# Patient Record
Sex: FEMALE | Race: BLACK OR AFRICAN AMERICAN | NOT HISPANIC OR LATINO | Employment: OTHER | URBAN - METROPOLITAN AREA
[De-identification: names, ages, dates, MRNs, and addresses within clinical notes are randomized per-mention and may not be internally consistent; named-entity substitution may affect disease eponyms.]

---

## 2017-07-31 ENCOUNTER — OFFICE VISIT (OUTPATIENT)
Dept: PULMONOLOGY | Facility: CLINIC | Age: 62
End: 2017-07-31

## 2017-07-31 VITALS
HEART RATE: 96 BPM | HEIGHT: 62 IN | SYSTOLIC BLOOD PRESSURE: 150 MMHG | BODY MASS INDEX: 42.91 KG/M2 | TEMPERATURE: 99.3 F | DIASTOLIC BLOOD PRESSURE: 85 MMHG | RESPIRATION RATE: 16 BRPM | WEIGHT: 233.2 LBS | OXYGEN SATURATION: 95 %

## 2017-07-31 DIAGNOSIS — J45.20 MILD INTERMITTENT ASTHMA WITHOUT COMPLICATION: ICD-10-CM

## 2017-07-31 DIAGNOSIS — E66.01 MORBID OBESITY DUE TO EXCESS CALORIES (HCC): ICD-10-CM

## 2017-07-31 DIAGNOSIS — K21.9 GERD WITHOUT ESOPHAGITIS: ICD-10-CM

## 2017-07-31 DIAGNOSIS — I50.43 ACUTE ON CHRONIC COMBINED SYSTOLIC AND DIASTOLIC CONGESTIVE HEART FAILURE (HCC): ICD-10-CM

## 2017-07-31 DIAGNOSIS — R06.02 SHORTNESS OF BREATH: Primary | ICD-10-CM

## 2017-07-31 PROCEDURE — 94010 BREATHING CAPACITY TEST: CPT | Performed by: NURSE PRACTITIONER

## 2017-07-31 PROCEDURE — 99214 OFFICE O/P EST MOD 30 MIN: CPT | Performed by: NURSE PRACTITIONER

## 2017-07-31 PROCEDURE — 94200 LUNG FUNCTION TEST (MBC/MVV): CPT | Performed by: NURSE PRACTITIONER

## 2017-07-31 RX ORDER — LANSOPRAZOLE 30 MG/1
30 CAPSULE, DELAYED RELEASE ORAL DAILY
COMMUNITY
End: 2017-07-31 | Stop reason: SDUPTHER

## 2017-07-31 RX ORDER — MONTELUKAST SODIUM 10 MG/1
TABLET ORAL
Qty: 30 TABLET | Refills: 11 | Status: SHIPPED | OUTPATIENT
Start: 2017-07-31 | End: 2019-03-20 | Stop reason: SDUPTHER

## 2017-07-31 RX ORDER — LANSOPRAZOLE 30 MG/1
30 CAPSULE, DELAYED RELEASE ORAL DAILY
Qty: 30 CAPSULE | Refills: 10 | Status: SHIPPED | OUTPATIENT
Start: 2017-07-31 | End: 2019-07-05 | Stop reason: SDUPTHER

## 2017-11-07 ENCOUNTER — OFFICE VISIT (OUTPATIENT)
Dept: PULMONOLOGY | Facility: CLINIC | Age: 62
End: 2017-11-07

## 2017-11-07 VITALS
WEIGHT: 218.6 LBS | OXYGEN SATURATION: 96 % | TEMPERATURE: 97 F | DIASTOLIC BLOOD PRESSURE: 74 MMHG | BODY MASS INDEX: 40.23 KG/M2 | HEIGHT: 62 IN | SYSTOLIC BLOOD PRESSURE: 136 MMHG | RESPIRATION RATE: 16 BRPM | HEART RATE: 90 BPM

## 2017-11-07 DIAGNOSIS — K21.9 GERD WITHOUT ESOPHAGITIS: ICD-10-CM

## 2017-11-07 DIAGNOSIS — E66.01 MORBID OBESITY (HCC): ICD-10-CM

## 2017-11-07 DIAGNOSIS — J45.909 ASTHMA, UNSPECIFIED ASTHMA SEVERITY, UNSPECIFIED WHETHER COMPLICATED, UNSPECIFIED WHETHER PERSISTENT: Primary | ICD-10-CM

## 2017-11-07 DIAGNOSIS — I50.40 COMBINED SYSTOLIC AND DIASTOLIC CONGESTIVE HEART FAILURE, UNSPECIFIED CONGESTIVE HEART FAILURE CHRONICITY: ICD-10-CM

## 2017-11-07 PROCEDURE — 99214 OFFICE O/P EST MOD 30 MIN: CPT | Performed by: NURSE PRACTITIONER

## 2017-11-07 PROCEDURE — 94010 BREATHING CAPACITY TEST: CPT | Performed by: NURSE PRACTITIONER

## 2017-11-07 NOTE — PROGRESS NOTES
[facility] Pulmonary Follow up    CHIEF COMPLAINT       Six-month follow-up asthma    HISTORY OF PRESENT ILLNESS    Dorene Huizar is a 62 y.o.female here today for follow up; she was last here 7/31/17. She has a h/o of GERD, asthma, allergic rhinitis, and CHF.   At that time she was status post a sleep study, she was diagnosed with sleep apnea in 2010 and could never tolerated.  Luckily the study from 10/2016 did not show any evidence of sleep apnea nor did she desaturated.    Her oxygen was discontinued at that time.  She has a history of asthma, chronic diastolic congestive heart failure, and GERD.    She is taking breo; 100; 1 puff daily and has been compliant with this. In the past she hasnt taken her inhalers or medications very well. She has had no recent exacerbations.  She currently is not using the pro air.    She has a h/o of GERD; and has been compliant with prevacid; she denies  indigestion or dysphagia. She does try to follow GERD precautions.      She has no chest pains, she has chronic lower extremity edema and tries to monitor her weight.  She does remain on Lasix daily. Edema is mild now.   She has had no recent hospitalizations or emergency room visits.    She is a lifelong nonsmoker.    She has some nasal gtt; but remains on Singulair which helps.       Patient Active Problem List   Diagnosis   • Atopic rhinitis   • Asthma   • Diabetes mellitus   • Hypertension   • Morbid obesity   • GERD without esophagitis   • Combined systolic and diastolic congestive heart failure   • Unspecified diastolic (congestive) heart failure   • Snoring       No Known Allergies    Current Outpatient Prescriptions:   •  albuterol (PROVENTIL HFA;VENTOLIN HFA) 108 (90 BASE) MCG/ACT inhaler, Inhale 2 puffs every 4 (four) hours as needed for wheezing., Disp: 18 g, Rfl: 5  •  aspirin 81 MG tablet, Take 1 tablet by mouth daily., Disp: , Rfl:   •  Blood Glucose Monitoring Suppl (ONE TOUCH ULTRA MINI) W/DEVICE kit, , Disp: ,  "Rfl:   •  Calcium 600-200 MG-UNIT per tablet, Take 1 tablet by mouth daily., Disp: , Rfl:   •  Cholecalciferol (VITAMIN D PO), Take  by mouth., Disp: , Rfl:   •  FLUTICASONE PROPIONATE NA, into each nostril., Disp: , Rfl:   •  furosemide (LASIX) 20 MG tablet, Take 1 tablet by mouth daily as needed., Disp: , Rfl:   •  glucose blood (ONE TOUCH ULTRA TEST) test strip, OneTouch Ultra Blue In Vitro Strip; Patient Sig: OneTouch Ultra Blue In Vitro Strip ; 50; 0; 07-Mar-2014; Active, Disp: , Rfl:   •  lansoprazole (PREVACID) 30 MG capsule, Take 1 capsule by mouth Daily., Disp: 30 capsule, Rfl: 10  •  losartan (COZAAR) 50 MG tablet, Take  by mouth., Disp: , Rfl:   •  metFORMIN (GLUCOPHAGE) 1000 MG tablet, Take 1 tablet by mouth 2 (two) times a day., Disp: , Rfl:   •  mometasone-formoterol (DULERA) 200-5 MCG/ACT inhaler, every 12 (twelve) hours., Disp: , Rfl:   •  montelukast (SINGULAIR) 10 MG tablet, Take 1 pill daily, Disp: 30 tablet, Rfl: 11  •  potassium chloride (K-DUR,KLOR-CON) 10 MEQ CR tablet, Take  by mouth., Disp: , Rfl:   MEDICATION LIST AND ALLERGIES REVIEWED.    Social History   Substance Use Topics   • Smoking status: Never Smoker   • Smokeless tobacco: None   • Alcohol use No       FAMILY AND SOCIAL HISTORY REVIEWED.    Review of Systems   Constitutional: Negative for activity change, chills, fatigue and fever.   HENT: Negative for hearing loss, nosebleeds, postnasal drip and sneezing.    Respiratory: Negative for apnea, cough, chest tightness, shortness of breath, wheezing and stridor.    Cardiovascular: Positive for leg swelling. Negative for chest pain and palpitations.        NO INCREASE IN MILD BLE EDEMA; NO CALF TENDERNESS    Gastrointestinal: Negative for abdominal pain, diarrhea and nausea.   Neurological: Negative for dizziness, syncope and light-headedness.   .    /74  Pulse 90  Temp 97 °F (36.1 °C)  Resp 16  Ht 62\" (157.5 cm)  Wt 218 lb 9.6 oz (99.2 kg)  SpO2 96% Comment: RA  BMI 39.98 " kg/m2  Physical Exam   Constitutional: She is oriented to person, place, and time. She appears well-developed. No distress.   HENT:   Head: Normocephalic.   Eyes: Pupils are equal, round, and reactive to light.   Neck: No JVD present. No thyromegaly present.   Cardiovascular: Normal rate, regular rhythm and normal heart sounds.  Exam reveals no friction rub.    Mild BLE edema; no venous cords or calf tenderness.    Pulmonary/Chest: Effort normal. No stridor. No respiratory distress. She has no wheezes. She has no rales. She exhibits no tenderness.   Lymphadenopathy:     She has no cervical adenopathy.   Neurological: She is alert and oriented to person, place, and time.   Skin: She is not diaphoretic.   Psychiatric: She has a normal mood and affect. Her behavior is normal. Thought content normal.       RESULTS    No obstruction, MVV normal; restrictive pattern as before probably due to her weight.     PROBLEM LIST    Problem List Items Addressed This Visit        Cardiovascular and Mediastinum    Combined systolic and diastolic congestive heart failure       Respiratory    Asthma - Primary    Overview     Description: A.  Poor compliance related to medication cost.         Relevant Orders    Spirometry Without Bronchodilator (Completed)       Digestive    Morbid obesity    Overview     Description: A.  BMI 41.15 as of 2/11/2015.         GERD without esophagitis            DISCUSSION    STay on the breo and singulair.     Remain on Prevacid and she was told to stay on this    She was instructed to remain on the Lasix daily    Follow-up in 3 months with repeat spirometry    The patient was told to call and given extensions 132, 112, 104 if needed for an earlier visit if problems arise prior to the scheduled followup.     Patti Ann, ESSIE  11/07/20175:54 PM  Electronically signed     Please note that portions of this note were completed with a voice recognition program. Efforts were made to edit the dictations,  but occasionally words are mistranscribed.      CC: Jay Beasley MD

## 2018-03-13 ENCOUNTER — OFFICE VISIT (OUTPATIENT)
Dept: PULMONOLOGY | Facility: CLINIC | Age: 63
End: 2018-03-13

## 2018-03-13 VITALS
DIASTOLIC BLOOD PRESSURE: 80 MMHG | WEIGHT: 217 LBS | HEIGHT: 62 IN | HEART RATE: 92 BPM | SYSTOLIC BLOOD PRESSURE: 122 MMHG | RESPIRATION RATE: 18 BRPM | BODY MASS INDEX: 39.93 KG/M2 | OXYGEN SATURATION: 96 % | TEMPERATURE: 98.6 F

## 2018-03-13 DIAGNOSIS — J45.909 ASTHMA, UNSPECIFIED ASTHMA SEVERITY, UNSPECIFIED WHETHER COMPLICATED, UNSPECIFIED WHETHER PERSISTENT: Primary | ICD-10-CM

## 2018-03-13 PROCEDURE — 99214 OFFICE O/P EST MOD 30 MIN: CPT | Performed by: NURSE PRACTITIONER

## 2018-03-13 PROCEDURE — 94010 BREATHING CAPACITY TEST: CPT | Performed by: NURSE PRACTITIONER

## 2018-03-13 RX ORDER — ATORVASTATIN CALCIUM 40 MG/1
TABLET, FILM COATED ORAL
Refills: 6 | COMMUNITY
Start: 2018-02-14

## 2018-03-13 NOTE — PROGRESS NOTES
[facility] Pulmonary Follow up    CHIEF COMPLAINT    Six-month follow-up asthma    HISTORY OF PRESENT ILLNESS    Dorene Huizar is a 62 y.o.female here today for fu. She has a h/o of GERD, asthma, allergic rhinitis, and CHF.   At that time she was status post a sleep study, she was diagnosed with sleep apnea in 2010 and could never tolerated.  Luckily the study from 10/2016 did not show any evidence of sleep apnea nor did she desaturated.    Her oxygen was discontinued at that time.  She has a history of asthma, chronic diastolic congestive heart failure, and GERD.    She is taking breo; 100; 1 puff daily and has been compliant with this. In the past she hasnt taken her inhalers or medications very well. She has had no recent exacerbations.  She currently is not using the pro air.    She has a h/o of GERD; and has been compliant with prevacid; she denies  indigestion or dysphagia. She does try to follow GERD precautions.      She has no chest pains, she has chronic lower extremity edema and tries to monitor her weight.  She does remain on Lasix daily. Edema is mild now.   She has had no recent hospitalizations or emergency room visits.    She is a lifelong nonsmoker.    She has some nasal gtt; but remains on Singulair which helps.         Patient Active Problem List   Diagnosis   • Atopic rhinitis   • Asthma   • Diabetes mellitus   • Hypertension   • Morbid obesity   • GERD without esophagitis   • Combined systolic and diastolic congestive heart failure   • Unspecified diastolic (congestive) heart failure   • Snoring       No Known Allergies    Current Outpatient Prescriptions:   •  albuterol (PROVENTIL HFA;VENTOLIN HFA) 108 (90 BASE) MCG/ACT inhaler, Inhale 2 puffs every 4 (four) hours as needed for wheezing., Disp: 18 g, Rfl: 5  •  aspirin 81 MG tablet, Take 1 tablet by mouth daily., Disp: , Rfl:   •  atorvastatin (LIPITOR) 40 MG tablet, TK 1 T PO HS, Disp: , Rfl: 6  •  Blood Glucose Monitoring Suppl (ONE TOUCH  ULTRA MINI) W/DEVICE kit, , Disp: , Rfl:   •  CALCIUM 600+D 600-200 MG-UNIT per tablet, TK 2 TS PO QD, Disp: , Rfl: 11  •  Calcium 600-200 MG-UNIT per tablet, Take 1 tablet by mouth daily., Disp: , Rfl:   •  Cholecalciferol (VITAMIN D PO), Take  by mouth., Disp: , Rfl:   •  Fluticasone Furoate-Vilanterol 200-25 MCG/INH aerosol powder , Inhale 1 puff Daily. 1 inhalation once a day, Disp: 1 each, Rfl: 5  •  FLUTICASONE PROPIONATE NA, into each nostril., Disp: , Rfl:   •  furosemide (LASIX) 20 MG tablet, Take 1 tablet by mouth daily as needed., Disp: , Rfl:   •  glucose blood (ONE TOUCH ULTRA TEST) test strip, OneTouch Ultra Blue In Vitro Strip; Patient Sig: OneTouch Ultra Blue In Vitro Strip ; 50; 0; 07-Mar-2014; Active, Disp: , Rfl:   •  lansoprazole (PREVACID) 30 MG capsule, Take 1 capsule by mouth Daily., Disp: 30 capsule, Rfl: 10  •  losartan (COZAAR) 50 MG tablet, Take  by mouth., Disp: , Rfl:   •  metFORMIN (GLUCOPHAGE) 1000 MG tablet, Take 1 tablet by mouth 2 (two) times a day., Disp: , Rfl:   •  montelukast (SINGULAIR) 10 MG tablet, Take 1 pill daily, Disp: 30 tablet, Rfl: 11  •  potassium chloride (K-DUR,KLOR-CON) 10 MEQ CR tablet, Take  by mouth., Disp: , Rfl:   MEDICATION LIST AND ALLERGIES REVIEWED.    Social History   Substance Use Topics   • Smoking status: Never Smoker   • Smokeless tobacco: Not on file   • Alcohol use No       FAMILY AND SOCIAL HISTORY REVIEWED.    Review of Systems   Constitutional: Negative for activity change, chills, fatigue and fever.   HENT: Positive for postnasal drip. Negative for hearing loss, nosebleeds and sneezing.    Respiratory: Positive for cough. Negative for apnea, chest tightness, shortness of breath, wheezing and stridor.    Cardiovascular: Negative for chest pain, palpitations and leg swelling.        NO INCREASE IN MILD BLE EDEMA; NO CALF TENDERNESS    Gastrointestinal: Negative for abdominal pain, diarrhea and nausea.   Neurological: Negative for dizziness,  "syncope and light-headedness.   .    /80 (BP Location: Right arm, Patient Position: Sitting, Cuff Size: Adult)   Pulse 92   Temp 98.6 °F (37 °C)   Resp 18   Ht 157.5 cm (62.01\")   Wt 98.4 kg (217 lb)   SpO2 96%   BMI 39.68 kg/m²   Physical Exam   Constitutional: She is oriented to person, place, and time. She appears well-developed. No distress.   HENT:   Head: Normocephalic.   Eyes: Pupils are equal, round, and reactive to light.   Neck: No JVD present. No thyromegaly present.   Cardiovascular: Normal rate, regular rhythm and normal heart sounds.  Exam reveals no friction rub.    Mild BLE edema; no venous cords or calf tenderness.    Pulmonary/Chest: Effort normal. No stridor. No respiratory distress. She has no wheezes. She has no rales. She exhibits no tenderness.   Lymphadenopathy:     She has no cervical adenopathy.   Neurological: She is alert and oriented to person, place, and time.   Skin: She is not diaphoretic.   Psychiatric: She has a normal mood and affect. Her behavior is normal. Thought content normal.       RESULTS    No obstruction, FVC and FEV1 improved compared to prior.  FVC 1.76 L, 76%.  FEV1 1.28 L, 71%.  Minute ventilation reduced.      PROBLEM LIST    Asthma  --Improved on Brio    Diastolic congestive heart failure  --On Lasix with improvement in prior edema    History of DALILA  --Diagnosed in 2010, however repeat sleep study October 2016 did not show any evidence of sleep apnea nor did she desaturate    GERD    DISCUSSION    Continue on the Brio, Singulair and pro air as needed    She was asking if she needed a sleep study for recheck but unless she gains significant amount of weight we don't need to do this.  We reviewed her prior study.    Spirometry was reviewed today    She'll follow-up in 6 months,and was told to call and given extensions 104 if needed for an earlier visit if problems arise prior to the scheduled followup.      Patti Ann, APRN  03/13/201810:47 " AM  Electronically signed     Please note that portions of this note were completed with a voice recognition program. Efforts were made to edit the dictations, but occasionally words are mistranscribed.      CC: Jay Beasley MD

## 2018-09-18 DIAGNOSIS — J45.40 MODERATE PERSISTENT ASTHMA WITHOUT COMPLICATION: ICD-10-CM

## 2018-09-18 RX ORDER — ALBUTEROL SULFATE 90 UG/1
2 AEROSOL, METERED RESPIRATORY (INHALATION) EVERY 4 HOURS PRN
Qty: 18 G | Refills: 5 | Status: SHIPPED | OUTPATIENT
Start: 2018-09-18 | End: 2018-09-20 | Stop reason: SDUPTHER

## 2018-09-18 NOTE — TELEPHONE ENCOUNTER
Per fax drug change request for Rx Proventil Inhaler. Verbalized with Alex(pharmacist) okay to change Rx Proventil to Ventolin with same directions, quantity, and refills. Alex verbalized understanding.

## 2018-09-18 NOTE — TELEPHONE ENCOUNTER
Pt LVM requesting refill on Rx Albuterol Inhaler send to [facility]. Refilled Rx Albuterol per chart via fax. Pt notified and verbalized understanding.

## 2018-09-20 ENCOUNTER — OFFICE VISIT (OUTPATIENT)
Dept: PULMONOLOGY | Facility: CLINIC | Age: 63
End: 2018-09-20

## 2018-09-20 VITALS
TEMPERATURE: 97.4 F | WEIGHT: 215 LBS | HEIGHT: 62 IN | DIASTOLIC BLOOD PRESSURE: 98 MMHG | RESPIRATION RATE: 18 BRPM | SYSTOLIC BLOOD PRESSURE: 130 MMHG | OXYGEN SATURATION: 97 % | BODY MASS INDEX: 39.56 KG/M2 | HEART RATE: 85 BPM

## 2018-09-20 DIAGNOSIS — R06.83 SNORING: ICD-10-CM

## 2018-09-20 DIAGNOSIS — J30.2 CHRONIC SEASONAL ALLERGIC RHINITIS, UNSPECIFIED TRIGGER: ICD-10-CM

## 2018-09-20 DIAGNOSIS — E66.01 MORBID OBESITY (HCC): ICD-10-CM

## 2018-09-20 DIAGNOSIS — J45.40 MODERATE PERSISTENT ASTHMA WITHOUT COMPLICATION: ICD-10-CM

## 2018-09-20 DIAGNOSIS — J45.20 MILD INTERMITTENT ASTHMA WITHOUT COMPLICATION: ICD-10-CM

## 2018-09-20 DIAGNOSIS — R06.02 SOB (SHORTNESS OF BREATH): Primary | ICD-10-CM

## 2018-09-20 PROCEDURE — 94375 RESPIRATORY FLOW VOLUME LOOP: CPT | Performed by: NURSE PRACTITIONER

## 2018-09-20 PROCEDURE — 99213 OFFICE O/P EST LOW 20 MIN: CPT | Performed by: NURSE PRACTITIONER

## 2018-09-20 RX ORDER — ALBUTEROL SULFATE 90 UG/1
2 AEROSOL, METERED RESPIRATORY (INHALATION) EVERY 4 HOURS PRN
Qty: 18 G | Refills: 5 | Status: SHIPPED | OUTPATIENT
Start: 2018-09-20 | End: 2019-09-30 | Stop reason: SDUPTHER

## 2018-09-20 NOTE — PROGRESS NOTES
[facility] Pulmonary Follow up    CHIEF COMPLAINT    Mild intermittent asthma    HISTORY OF PRESENT ILLNESS    Dorene Huizar is a 63 y.o.female here today for routine follow-up.  She is less in the office in March 2018 by Mrs. Ann for a history of mild intermittent asthma with allergic rhinitis.  She uses Breo daily and as needed albuterol.  She only occasionally uses her albuterol.  She's not using any of her nebulizers currently.  Last week she visited her primary care with worsening post nasal drainage and wheezing.  She is completed a course of steroids.  She's not had any wheezing last couple days.  She does have a nonproductive cough.  She continues have quite a bit of postnasal drainage.    She also has chronic reflux and is on a PPI.  She's had no issues with reflux or dysphagia lately.    She follows up with her primary care for her chronic diastolic heart failure with some chronic lower extremity edema.        Patient Active Problem List   Diagnosis   • Atopic rhinitis   • Asthma   • Diabetes mellitus (CMS/HCC)   • Hypertension   • Morbid obesity (CMS/HCC)   • GERD without esophagitis   • Combined systolic and diastolic congestive heart failure (CMS/HCC)   • Unspecified diastolic (congestive) heart failure (CMS/HCC)   • Snoring       No Known Allergies    Current Outpatient Prescriptions:   •  albuterol (PROVENTIL HFA;VENTOLIN HFA) 108 (90 Base) MCG/ACT inhaler, Inhale 2 puffs Every 4 (Four) Hours As Needed for Wheezing., Disp: 18 g, Rfl: 5  •  aspirin 81 MG tablet, Take 1 tablet by mouth daily., Disp: , Rfl:   •  atorvastatin (LIPITOR) 40 MG tablet, TK 1 T PO HS, Disp: , Rfl: 6  •  Blood Glucose Monitoring Suppl (ONE TOUCH ULTRA MINI) W/DEVICE kit, , Disp: , Rfl:   •  CALCIUM 600+D 600-200 MG-UNIT per tablet, TK 2 TS PO QD, Disp: , Rfl: 11  •  Calcium 600-200 MG-UNIT per tablet, Take 1 tablet by mouth daily., Disp: , Rfl:   •  Cholecalciferol (VITAMIN D PO), Take  by mouth., Disp: , Rfl:   •   Fluticasone Furoate-Vilanterol 200-25 MCG/INH aerosol powder , Inhale 1 puff Daily. 1 inhalation once a day, Disp: 1 each, Rfl: 11  •  FLUTICASONE PROPIONATE NA, into each nostril., Disp: , Rfl:   •  furosemide (LASIX) 20 MG tablet, Take 1 tablet by mouth daily as needed., Disp: , Rfl:   •  glucose blood (ONE TOUCH ULTRA TEST) test strip, OneTouch Ultra Blue In Vitro Strip; Patient Sig: OneTouch Ultra Blue In Vitro Strip ; 50; 0; 07-Mar-2014; Active, Disp: , Rfl:   •  lansoprazole (PREVACID) 30 MG capsule, Take 1 capsule by mouth Daily., Disp: 30 capsule, Rfl: 10  •  losartan (COZAAR) 50 MG tablet, Take  by mouth., Disp: , Rfl:   •  metFORMIN (GLUCOPHAGE) 1000 MG tablet, Take 1 tablet by mouth 2 (two) times a day., Disp: , Rfl:   •  montelukast (SINGULAIR) 10 MG tablet, Take 1 pill daily, Disp: 30 tablet, Rfl: 11  •  potassium chloride (K-DUR,KLOR-CON) 10 MEQ CR tablet, Take  by mouth., Disp: , Rfl:   MEDICATION LIST AND ALLERGIES REVIEWED.    Social History   Substance Use Topics   • Smoking status: Never Smoker   • Smokeless tobacco: Not on file   • Alcohol use No       FAMILY AND SOCIAL HISTORY REVIEWED.    Review of Systems   Constitutional: Negative for chills, fatigue, fever and unexpected weight change.   HENT: Positive for postnasal drip. Negative for congestion, nosebleeds, rhinorrhea, sinus pressure and trouble swallowing.    Respiratory: Positive for chest tightness, shortness of breath and wheezing. Negative for cough.    Cardiovascular: Negative for chest pain and leg swelling.   Gastrointestinal: Negative for abdominal pain, constipation, diarrhea, nausea and vomiting.   Genitourinary: Negative for dysuria, frequency, hematuria and urgency.   Musculoskeletal: Negative for myalgias.   Neurological: Negative for dizziness, weakness, numbness and headaches.   All other systems reviewed and are negative.  .    /98 (BP Location: Left arm, Patient Position: Sitting, Cuff Size: Adult)   Pulse 85    "Temp 97.4 °F (36.3 °C)   Resp 18   Ht 157.5 cm (62\")   Wt 97.5 kg (215 lb)   SpO2 97% Comment: RA AT REST  BMI 39.32 kg/m²       There is no immunization history on file for this patient.    Physical Exam   Constitutional: She is oriented to person, place, and time. She appears well-developed and well-nourished.   HENT:   Head: Normocephalic and atraumatic.   Eyes: Pupils are equal, round, and reactive to light. EOM are normal.   Neck: Normal range of motion. Neck supple.   Cardiovascular: Normal rate and regular rhythm.    No murmur heard.  Pulmonary/Chest: Effort normal and breath sounds normal. No respiratory distress. She has no wheezes. She has no rales.   Abdominal: Soft. Bowel sounds are normal. She exhibits no distension.   Musculoskeletal: Normal range of motion. She exhibits no edema.   Neurological: She is alert and oriented to person, place, and time.   Skin: Skin is warm and dry. No erythema.   Psychiatric: She has a normal mood and affect. Her behavior is normal.   Vitals reviewed.        RESULTS    PFTS in the office today, read by me:  Moderate restriction with a forced vital capacity 1.46, 64%.    PROBLEM LIST    Problem List Items Addressed This Visit        Respiratory    Atopic rhinitis    Asthma    Overview     Description: A.  Poor compliance related to medication cost.         Relevant Medications    Fluticasone Furoate-Vilanterol 200-25 MCG/INH aerosol powder     albuterol (PROVENTIL HFA;VENTOLIN HFA) 108 (90 Base) MCG/ACT inhaler    Snoring    Overview     History of obstructive sleep apnea which was treated around 2010, she stopped wearing the mask and her most recent study 10/27/16 revealed no DALILA, on room air no desaturations            Digestive    Morbid obesity (CMS/[facility])    Overview     Description: A.  BMI 41.15 as of 2/11/2015.           Other Visit Diagnoses     SOB (shortness of breath)    -  Primary    Relevant Orders    XR Chest PA & Lateral    Spirometry Without " Bronchodilator (Completed)            DISCUSSION    Overall she's done quite well.  She's had a little bit of worsening wheezing and cough with seasonal allergies.  Continue on her Singulair and over-the-counter antihistamine.  Continue on her Briel daily and albuterol as needed.  She does have nebulizers at home if it worsens.    We will and over her chest x-ray and PFTs in the office today.  She does continue have moderate restriction somewhat down from March but probably related to her recent exacerbation.    Follow-up in 6 months with repeat spiral.    I spent 15 minutes with the patient. I spent > 50% percent of this time counseling and discussing diagnostic testing, evaluation, current status and management.    Emily Rivero, APRN  09/20/201810:44 AM  Electronically signed     Please note that portions of this note were completed with a voice recognition program. Efforts were made to edit the dictations, but occasionally words are mistranscribed.      CC: Jay Beasley MD

## 2019-03-20 ENCOUNTER — OFFICE VISIT (OUTPATIENT)
Dept: PULMONOLOGY | Facility: CLINIC | Age: 64
End: 2019-03-20

## 2019-03-20 VITALS
WEIGHT: 220.2 LBS | HEIGHT: 62 IN | DIASTOLIC BLOOD PRESSURE: 72 MMHG | BODY MASS INDEX: 40.52 KG/M2 | SYSTOLIC BLOOD PRESSURE: 132 MMHG | TEMPERATURE: 97.9 F | HEART RATE: 95 BPM | OXYGEN SATURATION: 96 %

## 2019-03-20 DIAGNOSIS — R06.83 SNORING: ICD-10-CM

## 2019-03-20 DIAGNOSIS — J30.2 SEASONAL ALLERGIC RHINITIS, UNSPECIFIED TRIGGER: ICD-10-CM

## 2019-03-20 DIAGNOSIS — K21.9 GERD WITHOUT ESOPHAGITIS: ICD-10-CM

## 2019-03-20 DIAGNOSIS — J45.909 ASTHMA, UNSPECIFIED ASTHMA SEVERITY, UNSPECIFIED WHETHER COMPLICATED, UNSPECIFIED WHETHER PERSISTENT: Primary | ICD-10-CM

## 2019-03-20 DIAGNOSIS — E66.01 MORBID OBESITY (HCC): ICD-10-CM

## 2019-03-20 PROCEDURE — 99214 OFFICE O/P EST MOD 30 MIN: CPT | Performed by: NURSE PRACTITIONER

## 2019-03-20 PROCEDURE — 94375 RESPIRATORY FLOW VOLUME LOOP: CPT | Performed by: NURSE PRACTITIONER

## 2019-03-20 RX ORDER — MONTELUKAST SODIUM 10 MG/1
TABLET ORAL
Qty: 30 TABLET | Refills: 11 | Status: SHIPPED | OUTPATIENT
Start: 2019-03-20 | End: 2019-07-05 | Stop reason: SDUPTHER

## 2019-03-20 RX ORDER — FLUTICASONE PROPIONATE 50 MCG
1 SPRAY, SUSPENSION (ML) NASAL DAILY
Qty: 1 BOTTLE | Refills: 3 | Status: SHIPPED | OUTPATIENT
Start: 2019-03-20 | End: 2019-09-23

## 2019-03-20 NOTE — PROGRESS NOTES
[facility] Pulmonary Follow up    CHIEF COMPLAINT    Asthma    Subjective   HISTORY OF PRESENT ILLNESS    Dorene Huizar is a 63 y.o.female here today for routine 6-month follow-up on her asthma.  She does have mild intermittent asthma symptoms with moderate restriction on her PFTs.    Her chronic maintenance regimen includes Breo daily and Singulair nightly.  She has very limited rescue inhaler use.  She had one acute exacerbations as I last saw her secondary to being exposed to some secondhand smoke.  She completed a course of prednisone, and improved.    Today she has no cough or sputum production.  She rarely wheezes.  She has some chronic dyspnea on activity but nothing that changed over time.    She does not had any changes in her overall health history since I last saw her.    Patient Active Problem List   Diagnosis   • Allergic rhinitis   • Asthma   • Diabetes mellitus (CMS/HCC)   • Hypertension   • Morbid obesity (CMS/HCC)   • GERD without esophagitis   • Combined systolic and diastolic congestive heart failure (CMS/HCC)   • Unspecified diastolic (congestive) heart failure (CMS/HCC)   • Snoring       No Known Allergies    Current Outpatient Medications:   •  albuterol (PROVENTIL HFA;VENTOLIN HFA) 108 (90 Base) MCG/ACT inhaler, Inhale 2 puffs Every 4 (Four) Hours As Needed for Wheezing., Disp: 18 g, Rfl: 5  •  aspirin 81 MG tablet, Take 1 tablet by mouth daily., Disp: , Rfl:   •  atorvastatin (LIPITOR) 40 MG tablet, TK 1 T PO HS, Disp: , Rfl: 6  •  Blood Glucose Monitoring Suppl (ONE TOUCH ULTRA MINI) W/DEVICE kit, , Disp: , Rfl:   •  CALCIUM 600+D 600-200 MG-UNIT per tablet, TK 2 TS PO QD, Disp: , Rfl: 11  •  Calcium 600-200 MG-UNIT per tablet, Take 1 tablet by mouth daily., Disp: , Rfl:   •  Cholecalciferol (VITAMIN D PO), Take  by mouth., Disp: , Rfl:   •  fluticasone (FLONASE) 50 MCG/ACT nasal spray, 1 spray into the nostril(s) as directed by provider Daily., Disp: 1 bottle, Rfl: 3  •  Fluticasone  "Furoate-Vilanterol (BREO ELLIPTA) 200-25 MCG/INH inhaler, Inhale 1 puff Daily. 1 inhalation once a day, Disp: 1 each, Rfl: 11  •  furosemide (LASIX) 20 MG tablet, Take 1 tablet by mouth daily as needed., Disp: , Rfl:   •  glucose blood (ONE TOUCH ULTRA TEST) test strip, OneTouch Ultra Blue In Vitro Strip; Patient Sig: OneTouch Ultra Blue In Vitro Strip ; 50; 0; 07-Mar-2014; Active, Disp: , Rfl:   •  lansoprazole (PREVACID) 30 MG capsule, Take 1 capsule by mouth Daily., Disp: 30 capsule, Rfl: 10  •  losartan (COZAAR) 50 MG tablet, Take  by mouth., Disp: , Rfl:   •  metFORMIN (GLUCOPHAGE) 1000 MG tablet, Take 1 tablet by mouth 2 (two) times a day., Disp: , Rfl:   •  montelukast (SINGULAIR) 10 MG tablet, Take 1 pill daily, Disp: 30 tablet, Rfl: 11  •  potassium chloride (K-DUR,KLOR-CON) 10 MEQ CR tablet, Take  by mouth., Disp: , Rfl:   MEDICATION LIST AND ALLERGIES REVIEWED.    Social History     Tobacco Use   • Smoking status: Never Smoker   Substance Use Topics   • Alcohol use: No   • Drug use: No       FAMILY AND SOCIAL HISTORY REVIEWED.    Review of Systems   Constitutional: Negative for chills, fatigue, fever and unexpected weight change.   HENT: Negative for congestion, nosebleeds, postnasal drip, rhinorrhea, sinus pressure and trouble swallowing.    Respiratory: Negative for cough, chest tightness, shortness of breath and wheezing.    Cardiovascular: Negative for chest pain and leg swelling.   Gastrointestinal: Negative for abdominal pain, constipation, diarrhea, nausea and vomiting.   Genitourinary: Negative for dysuria, frequency, hematuria and urgency.   Musculoskeletal: Negative for myalgias.   Neurological: Negative for dizziness, weakness, numbness and headaches.   All other systems reviewed and are negative.  .  Objective   /72 (BP Location: Right arm, Patient Position: Sitting, Cuff Size: Adult)   Pulse 95   Temp 97.9 °F (36.6 °C)   Ht 157.5 cm (62\")   Wt 99.9 kg (220 lb 3.2 oz)   SpO2 96% " Comment: sitting room air  BMI 40.28 kg/m²     Immunization History   Administered Date(s) Administered   • Flu Vaccine High Dose PF 65YR+ 10/20/2018   • Hep A / Hep B 12/20/2018   • Pneumococcal Conjugate 13-Valent (PCV13) 11/20/2018       Physical Exam   Constitutional: She is oriented to person, place, and time. She appears well-developed and well-nourished.   HENT:   Head: Normocephalic and atraumatic.   Eyes: EOM are normal. Pupils are equal, round, and reactive to light.   Neck: Normal range of motion. Neck supple.   Cardiovascular: Normal rate and regular rhythm.   No murmur heard.  Pulmonary/Chest: Effort normal and breath sounds normal. No respiratory distress. She has no wheezes. She has no rales.   Abdominal: Soft. Bowel sounds are normal. She exhibits no distension.   Musculoskeletal: Normal range of motion. She exhibits no edema.   Neurological: She is alert and oriented to person, place, and time.   Skin: Skin is warm and dry. No erythema.   Psychiatric: She has a normal mood and affect. Her behavior is normal.   Vitals reviewed.        RESULTS    PFTS in the office today, read by me:  Mild restriction with an FVC of 1.70, 75%.  Actually improved from September 1 0.46, 64%    Assessment/Plan     PROBLEM LIST    Problem List Items Addressed This Visit        Respiratory    Allergic rhinitis    Asthma - Primary    Relevant Medications    Fluticasone Furoate-Vilanterol (BREO ELLIPTA) 200-25 MCG/INH inhaler    montelukast (SINGULAIR) 10 MG tablet    Other Relevant Orders    Spirometry Without Bronchodilator (Completed)    Snoring    Overview     History of obstructive sleep apnea which was treated around 2010, she stopped wearing the mask and her most recent study 10/27/16 revealed no DALILA, on room air no desaturations            Digestive    Morbid obesity (CMS/HCC)    Overview     Description: A.  BMI 41.15 as of 2/11/2015.         GERD without esophagitis            DISCUSSION    Today she is doing  quite well.  She only had one episode since I last saw her and that was secondary to some secondhand smoke exposure.  Continue on her Breo daily and Singulair nightly.    She does use some Flonase as needed seasonally for some nasal congestion, I will refill that for her today.    Follow-up in 6 months or sooner with any episodes of exacerbation.    I spent 15 minutes with the patient. I spent > 50% percent of this time counseling and discussing diagnostic testing, evaluation, current status and management.    Emily Rivero, APRN  03/20/201911:12 AM  Electronically signed     Please note that portions of this note were completed with a voice recognition program. Efforts were made to edit the dictations, but occasionally words are mistranscribed.      CC: Jay Beasley MD

## 2019-03-28 ENCOUNTER — TELEPHONE (OUTPATIENT)
Dept: PULMONOLOGY | Facility: CLINIC | Age: 64
End: 2019-03-28

## 2019-03-28 NOTE — TELEPHONE ENCOUNTER
Patient complaining of excess gas, states she's swallowing air and burping in excess. Asymptomatic otherwise. I advised her to contact her PCP and if symptoms worsen or SOA occurs, to present to ER.

## 2019-07-05 DIAGNOSIS — K21.9 GERD WITHOUT ESOPHAGITIS: Primary | ICD-10-CM

## 2019-07-05 DIAGNOSIS — J45.909 ASTHMA, UNSPECIFIED ASTHMA SEVERITY, UNSPECIFIED WHETHER COMPLICATED, UNSPECIFIED WHETHER PERSISTENT: Primary | ICD-10-CM

## 2019-07-05 RX ORDER — MONTELUKAST SODIUM 10 MG/1
TABLET ORAL
Qty: 30 TABLET | Refills: 11 | Status: SHIPPED | OUTPATIENT
Start: 2019-07-05 | End: 2021-10-28 | Stop reason: SDUPTHER

## 2019-07-05 RX ORDER — LANSOPRAZOLE 30 MG/1
30 CAPSULE, DELAYED RELEASE ORAL DAILY
Qty: 30 CAPSULE | Refills: 5 | Status: SHIPPED | OUTPATIENT
Start: 2019-07-05 | End: 2020-09-30 | Stop reason: ALTCHOICE

## 2019-09-23 ENCOUNTER — OFFICE VISIT (OUTPATIENT)
Dept: PULMONOLOGY | Facility: CLINIC | Age: 64
End: 2019-09-23

## 2019-09-23 VITALS
DIASTOLIC BLOOD PRESSURE: 76 MMHG | OXYGEN SATURATION: 96 % | HEIGHT: 62 IN | SYSTOLIC BLOOD PRESSURE: 130 MMHG | BODY MASS INDEX: 39.38 KG/M2 | WEIGHT: 214 LBS | HEART RATE: 76 BPM | TEMPERATURE: 98.3 F

## 2019-09-23 DIAGNOSIS — J45.20 MILD INTERMITTENT ASTHMA WITHOUT COMPLICATION: ICD-10-CM

## 2019-09-23 DIAGNOSIS — R06.83 SNORING: Primary | ICD-10-CM

## 2019-09-23 DIAGNOSIS — J30.2 SEASONAL ALLERGIC RHINITIS, UNSPECIFIED TRIGGER: ICD-10-CM

## 2019-09-23 PROCEDURE — 99213 OFFICE O/P EST LOW 20 MIN: CPT | Performed by: NURSE PRACTITIONER

## 2019-09-23 NOTE — PROGRESS NOTES
[facility] Pulmonary Follow up    CHIEF COMPLAINT    Asthma, shortness of breath     Subjective   HISTORY OF PRESENT ILLNESS    Dorene Huizar is a 64 y.o.female here today for routine follow-up on her mild intermittent asthma symptoms with moderate obstruction on her PFTs.  Chronically she is on Breo daily and Singulair.  She has been followed in our office for several years but by Dr. Dorsey as well as Patti Ann.  I last saw her in March.    She has done very well without any episodes of chest tightness or wheezing.  No cough or sputum production.  Her dyspnea is at baseline.  She has very little use of her rescue inhaler but does like to keep it handy just in case.    She states she did follow-up at [facility] pulmonary office and had a overnight sleep study done.  She said they told her she needed oxygen but no one is ever followed up to get that started for her.        Patient Active Problem List   Diagnosis   • Allergic rhinitis   • Asthma   • Diabetes mellitus (CMS/HCC)   • Hypertension   • Morbid obesity (CMS/HCC)   • GERD without esophagitis   • Combined systolic and diastolic congestive heart failure (CMS/HCC)   • Unspecified diastolic (congestive) heart failure (CMS/HCC)   • Snoring       No Known Allergies    Current Outpatient Medications:   •  albuterol (PROVENTIL HFA;VENTOLIN HFA) 108 (90 Base) MCG/ACT inhaler, Inhale 2 puffs Every 4 (Four) Hours As Needed for Wheezing., Disp: 18 g, Rfl: 5  •  aspirin 81 MG tablet, Take 1 tablet by mouth daily., Disp: , Rfl:   •  atorvastatin (LIPITOR) 40 MG tablet, TK 1 T PO HS, Disp: , Rfl: 6  •  Blood Glucose Monitoring Suppl (ONE TOUCH ULTRA MINI) W/DEVICE kit, , Disp: , Rfl:   •  CALCIUM 600+D 600-200 MG-UNIT per tablet, TK 2 TS PO QD, Disp: , Rfl: 11  •  Calcium 600-200 MG-UNIT per tablet, Take 1 tablet by mouth daily., Disp: , Rfl:   •  Cholecalciferol (VITAMIN D PO), Take  by mouth., Disp: , Rfl:   •  furosemide (LASIX) 20 MG tablet, Take 1  "tablet by mouth daily as needed., Disp: , Rfl:   •  glucose blood (ONE TOUCH ULTRA TEST) test strip, OneTouch Ultra Blue In Vitro Strip; Patient Sig: OneTouch Ultra Blue In Vitro Strip ; 50; 0; 07-Mar-2014; Active, Disp: , Rfl:   •  lansoprazole (PREVACID) 30 MG capsule, Take 1 capsule by mouth Daily., Disp: 30 capsule, Rfl: 5  •  losartan (COZAAR) 50 MG tablet, Take  by mouth., Disp: , Rfl:   •  metFORMIN (GLUCOPHAGE) 1000 MG tablet, Take 1 tablet by mouth 2 (two) times a day., Disp: , Rfl:   •  montelukast (SINGULAIR) 10 MG tablet, Take 1 pill daily, Disp: 30 tablet, Rfl: 11  •  potassium chloride (K-DUR,KLOR-CON) 10 MEQ CR tablet, Take  by mouth., Disp: , Rfl:   •  Fluticasone Furoate-Vilanterol (BREO ELLIPTA) 100-25 MCG/INH inhaler, Inhale 1 puff Daily. 1 inhalation once a day, Disp: 1 each, Rfl: 11  MEDICATION LIST AND ALLERGIES REVIEWED.    Social History     Tobacco Use   • Smoking status: Never Smoker   Substance Use Topics   • Alcohol use: No   • Drug use: No       FAMILY AND SOCIAL HISTORY REVIEWED.    Review of Systems   Constitutional: Negative for chills, fatigue, fever and unexpected weight change.   HENT: Negative for congestion, nosebleeds, postnasal drip, rhinorrhea, sinus pressure and trouble swallowing.    Respiratory: Negative for cough, chest tightness, shortness of breath and wheezing.    Cardiovascular: Negative for chest pain and leg swelling.   Gastrointestinal: Negative for abdominal pain, constipation, diarrhea, nausea and vomiting.   Genitourinary: Negative for dysuria, frequency, hematuria and urgency.   Musculoskeletal: Negative for myalgias.   Neurological: Negative for dizziness, weakness, numbness and headaches.   All other systems reviewed and are negative.  .  Objective   /76   Pulse 76   Temp 98.3 °F (36.8 °C)   Ht 157.5 cm (62\")   Wt 97.1 kg (214 lb)   LMP  (LMP Unknown)   SpO2 96% Comment: resting, room air  Breastfeeding? No   BMI 39.14 kg/m²     Immunization " History   Administered Date(s) Administered   • Flu Vaccine High Dose PF 65YR+ 10/20/2018   • Hep A / Hep B 12/20/2018   • Pneumococcal Conjugate 13-Valent (PCV13) 11/20/2018       Physical Exam   Constitutional: She is oriented to person, place, and time. She appears well-developed and well-nourished.   HENT:   Head: Normocephalic and atraumatic.   Eyes: EOM are normal. Pupils are equal, round, and reactive to light.   Neck: Normal range of motion. Neck supple.   Cardiovascular: Normal rate and regular rhythm.   No murmur heard.  Pulmonary/Chest: Effort normal and breath sounds normal. No respiratory distress. She has no wheezes. She has no rales.   Abdominal: Soft. Bowel sounds are normal. She exhibits no distension.   Musculoskeletal: Normal range of motion. She exhibits no edema.   Neurological: She is alert and oriented to person, place, and time.   Skin: Skin is warm and dry. No erythema.   Psychiatric: She has a normal mood and affect. Her behavior is normal.   Vitals reviewed.        RESULTS        Assessment/Plan     PROBLEM LIST    Problem List Items Addressed This Visit        Respiratory    Allergic rhinitis    Asthma    Relevant Medications    Fluticasone Furoate-Vilanterol (BREO ELLIPTA) 100-25 MCG/INH inhaler    Snoring - Primary    Overview     History of obstructive sleep apnea which was treated around 2010, she stopped wearing the mask and her most recent study 10/27/16 revealed no DALILA, on room air no desaturations                 DISCUSSION    Continue on her Breo, I will switch her to the 100 mcg instead of 200.  She has not had any episode of exacerbation or illnesses.  Continue with her seasonal allergy treatment.    I will follow-up on her recent home sleep study done at [facility] pulmonology.  She would like to stay here and have with her continued care at our office.    Follow-up in 6 months with repeat PFTs and a chest x-ray      I spent 15 minutes with the patient and family. I  spent > 50% percent of this time counseling and discussing evaluation, current status and management.    Emily Rivero, APRN  09/23/201910:40 AM  Electronically signed     Please note that portions of this note were completed with a voice recognition program. Efforts were made to edit the dictations, but occasionally words are mistranscribed.      CC: System, Provider Not In

## 2019-09-25 ENCOUNTER — TELEPHONE (OUTPATIENT)
Dept: PULMONOLOGY | Facility: CLINIC | Age: 64
End: 2019-09-25

## 2019-09-25 NOTE — TELEPHONE ENCOUNTER
Sandee called to let us know that the patient needs a new sleep test to get o2. Her sleep Study was longer than 30 days ago and it all has to be within 30 days. The patient should be seen and order another test.

## 2019-09-30 ENCOUNTER — TELEPHONE (OUTPATIENT)
Dept: PULMONOLOGY | Facility: CLINIC | Age: 64
End: 2019-09-30

## 2019-09-30 DIAGNOSIS — J45.40 MODERATE PERSISTENT ASTHMA WITHOUT COMPLICATION: ICD-10-CM

## 2019-09-30 RX ORDER — ALBUTEROL SULFATE 90 UG/1
2 AEROSOL, METERED RESPIRATORY (INHALATION) EVERY 4 HOURS PRN
Qty: 18 G | Refills: 5 | Status: SHIPPED | OUTPATIENT
Start: 2019-09-30 | End: 2020-03-20 | Stop reason: SDUPTHER

## 2019-10-08 ENCOUNTER — TELEPHONE (OUTPATIENT)
Dept: PULMONOLOGY | Facility: CLINIC | Age: 64
End: 2019-10-08

## 2019-10-21 ENCOUNTER — OFFICE VISIT (OUTPATIENT)
Dept: PULMONOLOGY | Facility: CLINIC | Age: 64
End: 2019-10-21

## 2019-10-21 VITALS
HEIGHT: 62 IN | HEART RATE: 110 BPM | DIASTOLIC BLOOD PRESSURE: 80 MMHG | WEIGHT: 221 LBS | SYSTOLIC BLOOD PRESSURE: 180 MMHG | BODY MASS INDEX: 40.67 KG/M2 | TEMPERATURE: 98.2 F | OXYGEN SATURATION: 98 %

## 2019-10-21 DIAGNOSIS — I50.30 DIASTOLIC CONGESTIVE HEART FAILURE, UNSPECIFIED HF CHRONICITY (HCC): ICD-10-CM

## 2019-10-21 DIAGNOSIS — J45.21 MILD INTERMITTENT ASTHMA WITH ACUTE EXACERBATION: Primary | ICD-10-CM

## 2019-10-21 PROCEDURE — 99213 OFFICE O/P EST LOW 20 MIN: CPT | Performed by: NURSE PRACTITIONER

## 2019-10-21 RX ORDER — PREDNISONE 20 MG/1
TABLET ORAL
Refills: 0 | COMMUNITY
Start: 2019-10-20 | End: 2020-09-30

## 2019-10-21 NOTE — PROGRESS NOTES
[facility] Pulmonary Follow up    CHIEF COMPLAINT    Asthma exacerbation    Subjective   HISTORY OF PRESENT ILLNESS    Dorene Huizar is a 64 y.o.female here today for a walk in sick visit.     She was seen in the emergency department at  last evening for an asthma exacerbation.  Her chest x-ray and CT scan of the chest was reviewed and had no acute findings.  She came into the office today asking to be seen.  She was still having some wheezing.  Apparently she did receive a IV Solu-Medrol in the hospital last evening and was put on prednisone 20 mg daily as well as some Tessalon Perles.    She continues to use her Breo 100, as well as her albuterol HFA.  She has a nebulizer machine at home but has not been using it, she is been out of medication.    She has no fevers or chills.  Her cough is largely clear in production.        Patient Active Problem List   Diagnosis   • Allergic rhinitis   • Asthma   • Diabetes mellitus (CMS/HCC)   • Hypertension   • Morbid obesity (CMS/HCC)   • GERD without esophagitis   • Combined systolic and diastolic congestive heart failure (CMS/HCC)   • Unspecified diastolic (congestive) heart failure (CMS/HCC)   • Snoring       No Known Allergies    Current Outpatient Medications:   •  Fluticasone Furoate-Vilanterol (BREO ELLIPTA) 100-25 MCG/INH inhaler, Inhale 1 puff Daily. 1 inhalation once a day, Disp: 1 each, Rfl: 11  •  albuterol sulfate  (90 Base) MCG/ACT inhaler, Inhale 2 puffs Every 4 (Four) Hours As Needed for Wheezing., Disp: 18 g, Rfl: 5  •  aspirin 81 MG tablet, Take 1 tablet by mouth daily., Disp: , Rfl:   •  atorvastatin (LIPITOR) 40 MG tablet, TK 1 T PO HS, Disp: , Rfl: 6  •  Blood Glucose Monitoring Suppl (ONE TOUCH ULTRA MINI) W/DEVICE kit, , Disp: , Rfl:   •  CALCIUM 600+D 600-200 MG-UNIT per tablet, TK 2 TS PO QD, Disp: , Rfl: 11  •  Calcium 600-200 MG-UNIT per tablet, Take 1 tablet by mouth daily., Disp: , Rfl:   •  Cholecalciferol (VITAMIN D PO), Take  by mouth.,  "Disp: , Rfl:   •  furosemide (LASIX) 20 MG tablet, Take 1 tablet by mouth daily as needed., Disp: , Rfl:   •  glucose blood (ONE TOUCH ULTRA TEST) test strip, OneTouch Ultra Blue In Vitro Strip; Patient Sig: OneTouch Ultra Blue In Vitro Strip ; 50; 0; 07-Mar-2014; Active, Disp: , Rfl:   •  lansoprazole (PREVACID) 30 MG capsule, Take 1 capsule by mouth Daily., Disp: 30 capsule, Rfl: 5  •  losartan (COZAAR) 50 MG tablet, Take  by mouth., Disp: , Rfl:   •  metFORMIN (GLUCOPHAGE) 1000 MG tablet, Take 1 tablet by mouth 2 (two) times a day., Disp: , Rfl:   •  montelukast (SINGULAIR) 10 MG tablet, Take 1 pill daily, Disp: 30 tablet, Rfl: 11  •  potassium chloride (K-DUR,KLOR-CON) 10 MEQ CR tablet, Take  by mouth., Disp: , Rfl:   •  predniSONE (DELTASONE) 20 MG tablet, TK 1 T PO QD STARTING ON 10-21-19, Disp: , Rfl: 0  MEDICATION LIST AND ALLERGIES REVIEWED.    Social History     Tobacco Use   • Smoking status: Never Smoker   Substance Use Topics   • Alcohol use: No   • Drug use: No       FAMILY AND SOCIAL HISTORY REVIEWED.    Review of Systems   Constitutional: Negative for chills, fatigue, fever and unexpected weight change.   HENT: Negative for congestion, nosebleeds, postnasal drip, rhinorrhea, sinus pressure and trouble swallowing.    Respiratory: Positive for cough, shortness of breath and wheezing. Negative for chest tightness.    Cardiovascular: Negative for chest pain and leg swelling.   Gastrointestinal: Negative for abdominal pain, constipation, diarrhea, nausea and vomiting.   Genitourinary: Negative for dysuria, frequency, hematuria and urgency.   Musculoskeletal: Negative for myalgias.   Neurological: Negative for dizziness, weakness, numbness and headaches.   All other systems reviewed and are negative.  .  Objective   /80 (BP Location: Right arm, Patient Position: Sitting)   Pulse 110   Temp 98.2 °F (36.8 °C)   Ht 157.5 cm (62\")   Wt 100 kg (221 lb)   LMP  (LMP Unknown)   SpO2 98% Comment: " resting at room air  BMI 40.42 kg/m²     Immunization History   Administered Date(s) Administered   • Flu Vaccine High Dose PF 65YR+ 10/20/2018   • Hep A / Hep B 12/20/2018   • Pneumococcal Conjugate 13-Valent (PCV13) 11/20/2018       Physical Exam   Constitutional: She is oriented to person, place, and time. She appears well-developed and well-nourished.   HENT:   Head: Normocephalic and atraumatic.   Eyes: EOM are normal. Pupils are equal, round, and reactive to light.   Neck: Normal range of motion. Neck supple.   Cardiovascular: Normal rate and regular rhythm.   No murmur heard.  Pulmonary/Chest: Effort normal. No respiratory distress. She has wheezes (insp and expitory ). She has no rales.   Abdominal: Soft. Bowel sounds are normal. She exhibits no distension.   Musculoskeletal: Normal range of motion. She exhibits no edema.   Neurological: She is alert and oriented to person, place, and time.   Skin: Skin is warm and dry. No erythema.   Psychiatric: She has a normal mood and affect. Her behavior is normal.   Vitals reviewed.        RESULTS            Assessment/Plan     PROBLEM LIST    Problem List Items Addressed This Visit        Cardiovascular and Mediastinum    Unspecified diastolic (congestive) heart failure (CMS/HCC)       Respiratory    Asthma - Primary            DISCUSSION    She does have some acute bronchospasms in the office today, no evidence of hypoxemia.   Continue her prednisone for her asthma exacerbation.  I did give her some albuterol nebulizers here in the office today to use for the next few days until her wheezing has subsided.    She does not appear acutely infected.    Routine follow-up as scheduled.    I spent 15 minutes with the patient. I spent > 50% percent of this time counseling and discussing evaluation, current status and management.    Emily Rivero, APRN  10/21/189283:55 PM  Electronically signed     Please note that portions of this note were completed with a voice  recognition program. Efforts were made to edit the dictations, but occasionally words are mistranscribed.      CC: Erlinda Monterroso, APRN

## 2019-12-23 ENCOUNTER — OFFICE VISIT (OUTPATIENT)
Dept: PULMONOLOGY | Facility: CLINIC | Age: 64
End: 2019-12-23

## 2019-12-23 VITALS
TEMPERATURE: 99 F | OXYGEN SATURATION: 97 % | RESPIRATION RATE: 18 BRPM | HEIGHT: 62 IN | SYSTOLIC BLOOD PRESSURE: 126 MMHG | WEIGHT: 215.25 LBS | DIASTOLIC BLOOD PRESSURE: 84 MMHG | HEART RATE: 89 BPM | BODY MASS INDEX: 39.61 KG/M2

## 2019-12-23 DIAGNOSIS — E66.01 MORBID OBESITY (HCC): ICD-10-CM

## 2019-12-23 DIAGNOSIS — I50.40 COMBINED SYSTOLIC AND DIASTOLIC CONGESTIVE HEART FAILURE, UNSPECIFIED HF CHRONICITY (HCC): ICD-10-CM

## 2019-12-23 DIAGNOSIS — J45.41 MODERATE PERSISTENT ASTHMA WITH ACUTE EXACERBATION: Primary | ICD-10-CM

## 2019-12-23 DIAGNOSIS — J30.2 SEASONAL ALLERGIC RHINITIS, UNSPECIFIED TRIGGER: ICD-10-CM

## 2019-12-23 PROCEDURE — 99213 OFFICE O/P EST LOW 20 MIN: CPT | Performed by: NURSE PRACTITIONER

## 2019-12-23 RX ORDER — AZITHROMYCIN 250 MG/1
250 TABLET, FILM COATED ORAL DAILY
COMMUNITY
Start: 2019-12-23 | End: 2020-06-01

## 2019-12-23 RX ORDER — OMEPRAZOLE 20 MG/1
20 CAPSULE, DELAYED RELEASE ORAL DAILY
COMMUNITY
End: 2021-10-15

## 2019-12-23 RX ORDER — FLUTICASONE PROPIONATE 50 MCG
SPRAY, SUSPENSION (ML) NASAL
COMMUNITY
End: 2020-09-30

## 2019-12-23 RX ORDER — GUAIFENESIN 600 MG/1
TABLET, EXTENDED RELEASE ORAL EVERY 12 HOURS
COMMUNITY
End: 2020-11-18 | Stop reason: SDUPTHER

## 2019-12-23 RX ORDER — PREDNISONE 20 MG/1
TABLET ORAL
COMMUNITY
End: 2020-09-30

## 2019-12-23 RX ORDER — LOSARTAN POTASSIUM 100 MG/1
TABLET ORAL DAILY
COMMUNITY
Start: 2019-11-25

## 2019-12-23 RX ORDER — LEVOCETIRIZINE DIHYDROCHLORIDE 5 MG/1
TABLET, FILM COATED ORAL
COMMUNITY

## 2019-12-23 NOTE — PROGRESS NOTES
[facility] Pulmonary Follow up    CHIEF COMPLAINT    Shortness of breath and wheezing    Subjective   HISTORY OF PRESENT ILLNESS    Dorene Huizar is a 64 y.o.female here today for shortness of air and wheezing.  She has had couple weeks worth of worsening shortness of breath and wheezing.  She complains an occasional cough but largely nonproductive.    She is also been having quite a bit of lower extremity swelling and.  At some point she had stopped taking her diuretic but is currently back on Lasix.  Her lower extremity edema has improved but not resolved.    She is not sure she noticed a difference in her wheezing with the volume overload.    She has been using her Breo at home as well as her Ventolin HFA.  She has nebulizers at home but has not been using them.    She was seen by her primary care provider today at [facility] and was given a course of antibiotics and steroids.    She states she usually gets sick about this time every year.    Patient Active Problem List   Diagnosis   • Allergic rhinitis   • Moderate persistent asthma with acute exacerbation   • Diabetes mellitus (CMS/HCC)   • Hypertension   • Morbid obesity (CMS/HCC)   • GERD without esophagitis   • Combined systolic and diastolic congestive heart failure (CMS/HCC)   • Unspecified diastolic (congestive) heart failure (CMS/HCC)   • Snoring       Allergies   Allergen Reactions   • Sulfamethoxazole-Trimethoprim Unknown - Low Severity   • Tetanus-Diphtheria Toxoids Td Hives       Current Outpatient Medications:   •  albuterol sulfate  (90 Base) MCG/ACT inhaler, Inhale 2 puffs Every 4 (Four) Hours As Needed for Wheezing., Disp: 18 g, Rfl: 5  •  aspirin 81 MG tablet, Take 1 tablet by mouth daily., Disp: , Rfl:   •  atorvastatin (LIPITOR) 40 MG tablet, TK 1 T PO HS, Disp: , Rfl: 6  •  azithromycin (ZITHROMAX) 250 MG tablet, Take 250 mg by mouth Daily., Disp: , Rfl:   •  Blood Glucose Monitoring Suppl (ONE TOUCH ULTRA MINI) W/DEVICE kit, ,  Disp: , Rfl:   •  CALCIUM 600+D 600-200 MG-UNIT per tablet, TK 2 TS PO QD, Disp: , Rfl: 11  •  Calcium 600-200 MG-UNIT per tablet, Take 1 tablet by mouth daily., Disp: , Rfl:   •  Cholecalciferol (VITAMIN D PO), Take  by mouth., Disp: , Rfl:   •  fluticasone (FLONASE) 50 MCG/ACT nasal spray, fluticasone propionate 50 mcg/actuation nasal spray,suspension, Disp: , Rfl:   •  Fluticasone Furoate-Vilanterol (BREO ELLIPTA) 100-25 MCG/INH inhaler, Inhale 1 puff Daily. 1 inhalation once a day, Disp: 1 each, Rfl: 11  •  furosemide (LASIX) 20 MG tablet, Take 1 tablet by mouth daily as needed., Disp: , Rfl:   •  glucose blood (ONE TOUCH ULTRA TEST) test strip, OneTouch Ultra Blue In Vitro Strip; Patient Sig: OneTouch Ultra Blue In Vitro Strip ; 50; 0; 07-Mar-2014; Active, Disp: , Rfl:   •  guaiFENesin (MUCINEX) 600 MG 12 hr tablet, Every 12 (Twelve) Hours., Disp: , Rfl:   •  lansoprazole (PREVACID) 30 MG capsule, Take 1 capsule by mouth Daily., Disp: 30 capsule, Rfl: 5  •  levocetirizine (XYZAL) 5 MG tablet, levocetirizine 5 mg tablet, Disp: , Rfl:   •  losartan (COZAAR) 100 MG tablet, Take  by mouth Daily., Disp: , Rfl:   •  losartan (COZAAR) 50 MG tablet, Take  by mouth., Disp: , Rfl:   •  metFORMIN (GLUCOPHAGE) 1000 MG tablet, Take 1 tablet by mouth 2 (two) times a day., Disp: , Rfl:   •  montelukast (SINGULAIR) 10 MG tablet, Take 1 pill daily, Disp: 30 tablet, Rfl: 11  •  omeprazole (priLOSEC) 20 MG capsule, Take 20 mg by mouth Daily., Disp: , Rfl:   •  potassium chloride (K-DUR,KLOR-CON) 10 MEQ CR tablet, Take 20 mEq by mouth Daily., Disp: , Rfl:   •  predniSONE (DELTASONE) 20 MG tablet, TK 1 T PO QD STARTING ON 10-21-19, Disp: , Rfl: 0  •  predniSONE (DELTASONE) 20 MG tablet, prednisone 20 mg tablet  Take 2 tablets every day by oral route for 3 days., Disp: , Rfl:   •  BREO ELLIPTA 200-25 MCG/INH inhaler, INHALE 1 PUFF PO QD, Disp: , Rfl:   MEDICATION LIST AND ALLERGIES REVIEWED.    Social History     Tobacco Use   •  "Smoking status: Never Smoker   Substance Use Topics   • Alcohol use: No   • Drug use: No       FAMILY AND SOCIAL HISTORY REVIEWED.    Review of Systems   Constitutional: Negative for chills, fatigue, fever and unexpected weight change.   HENT: Negative for congestion, nosebleeds, postnasal drip, rhinorrhea, sinus pressure and trouble swallowing.    Respiratory: Positive for cough, chest tightness and wheezing. Negative for shortness of breath.    Cardiovascular: Positive for leg swelling. Negative for chest pain.   Gastrointestinal: Negative for abdominal pain, constipation, diarrhea, nausea and vomiting.   Genitourinary: Negative for dysuria, frequency, hematuria and urgency.   Musculoskeletal: Negative for myalgias.   Allergic/Immunologic: Positive for environmental allergies.   Neurological: Negative for dizziness, weakness, numbness and headaches.   All other systems reviewed and are negative.  .  Objective   /84 (BP Location: Left arm, Patient Position: Sitting, Cuff Size: Adult)   Pulse 89   Temp 99 °F (37.2 °C)   Resp 18   Ht 157.5 cm (62\")   Wt 97.6 kg (215 lb 4 oz)   LMP  (LMP Unknown)   SpO2 97% Comment: room air at rest  BMI 39.37 kg/m²     Immunization History   Administered Date(s) Administered   • Fluzone High Dose =>65 Years ([employer] ONLY) 10/20/2018   • Hep A / Hep B 12/20/2018   • Pneumococcal Conjugate 13-Valent (PCV13) 11/20/2018       Physical Exam   Constitutional: She is oriented to person, place, and time. She appears well-developed and well-nourished.   HENT:   Head: Normocephalic and atraumatic.   Eyes: Pupils are equal, round, and reactive to light. EOM are normal.   Neck: Normal range of motion. Neck supple.   Cardiovascular: Normal rate and regular rhythm.   No murmur heard.  Pulmonary/Chest: Effort normal. No respiratory distress. She has wheezes (exp). She has no rales.   Abdominal: Soft. Bowel sounds are normal. She exhibits no distension.   Musculoskeletal: Normal range " of motion. She exhibits no edema.   Neurological: She is alert and oriented to person, place, and time.   Skin: Skin is warm and dry. No erythema.   Psychiatric: She has a normal mood and affect. Her behavior is normal.   Vitals reviewed.        RESULTS        Assessment/Plan     PROBLEM LIST    Problem List Items Addressed This Visit        Cardiovascular and Mediastinum    Combined systolic and diastolic congestive heart failure (CMS/HCC)       Respiratory    Allergic rhinitis    Relevant Medications    predniSONE (DELTASONE) 20 MG tablet    Moderate persistent asthma with acute exacerbation - Primary    Relevant Medications    BREO ELLIPTA 200-25 MCG/INH inhaler       Digestive    Morbid obesity (CMS/HCC)    Overview     Description: A.  BMI 41.15 as of 2/11/2015.                 DISCUSSION    Continue on the Breo.  She does have a nebulizer machine at home, I did recommend she do her albuterol nebulizers couple times a day for the next few days with her wheezing.  We did discuss not using her Ventolin HFA and nebulizers at the same time but they could be interchangeable.    I recommended she go ahead and get that antibiotic and prednisone filled that she got today by her primary care for her asthma exacerbation.    Continue follow-up with cardiology for her heart failure management.    routine follow up in 3 months with PFTs    I spent 15 minutes face to face with the patient. I spent > 50% percent of this time counseling and discussing evaluation, current status, treatment options and management.    ESSIE Quispe  12/23/20191:45 PM  Electronically signed     Please note that portions of this note were completed with a voice recognition program. Efforts were made to edit the dictations, but occasionally words are mistranscribed.      CC: Erlinda Monterroso APRN

## 2020-03-20 DIAGNOSIS — J45.40 MODERATE PERSISTENT ASTHMA WITHOUT COMPLICATION: ICD-10-CM

## 2020-03-20 RX ORDER — ALBUTEROL SULFATE 90 UG/1
2 AEROSOL, METERED RESPIRATORY (INHALATION) EVERY 4 HOURS PRN
Qty: 18 G | Refills: 5 | Status: SHIPPED | OUTPATIENT
Start: 2020-03-20 | End: 2020-11-10 | Stop reason: SDUPTHER

## 2020-05-22 NOTE — TELEPHONE ENCOUNTER
Rx Breo 200 reordered per chart via fax to [facility] per pt's request. Pt notified and verbalized understanding.

## 2020-06-01 ENCOUNTER — OFFICE VISIT (OUTPATIENT)
Dept: PULMONOLOGY | Facility: CLINIC | Age: 65
End: 2020-06-01

## 2020-06-01 VITALS
HEART RATE: 85 BPM | OXYGEN SATURATION: 99 % | RESPIRATION RATE: 16 BRPM | TEMPERATURE: 97.3 F | DIASTOLIC BLOOD PRESSURE: 80 MMHG | WEIGHT: 203 LBS | SYSTOLIC BLOOD PRESSURE: 128 MMHG | HEIGHT: 62 IN | BODY MASS INDEX: 37.36 KG/M2

## 2020-06-01 DIAGNOSIS — I50.40 COMBINED SYSTOLIC AND DIASTOLIC CONGESTIVE HEART FAILURE, UNSPECIFIED HF CHRONICITY (HCC): ICD-10-CM

## 2020-06-01 DIAGNOSIS — J45.40 MODERATE PERSISTENT ASTHMA WITHOUT COMPLICATION: Primary | ICD-10-CM

## 2020-06-01 DIAGNOSIS — J30.2 SEASONAL ALLERGIC RHINITIS, UNSPECIFIED TRIGGER: ICD-10-CM

## 2020-06-01 DIAGNOSIS — G47.33 OSA (OBSTRUCTIVE SLEEP APNEA): ICD-10-CM

## 2020-06-01 PROCEDURE — 99214 OFFICE O/P EST MOD 30 MIN: CPT | Performed by: NURSE PRACTITIONER

## 2020-06-01 RX ORDER — POTASSIUM CHLORIDE 20 MEQ/1
TABLET, EXTENDED RELEASE ORAL DAILY
COMMUNITY
Start: 2020-04-11 | End: 2021-10-15

## 2020-06-01 RX ORDER — IBUPROFEN 800 MG/1
TABLET ORAL
COMMUNITY
Start: 2020-03-23

## 2020-06-01 RX ORDER — ONDANSETRON 4 MG/1
TABLET, ORALLY DISINTEGRATING ORAL
COMMUNITY
Start: 2020-05-04 | End: 2021-10-15

## 2020-06-01 NOTE — PROGRESS NOTES
[facility] Pulmonary Follow up    CHIEF COMPLAINT    Asthma, DALILA    Subjective   HISTORY OF PRESENT ILLNESS    Dorene Huizar is a 64 y.o.female here today for routine follow up on her asthma.  She has been doing very well.  She has not had any wheezing or chest tightness.  No changes in her shortness of breath.   She continues to use her Breo daily and her ventolin as needed.  She has rarely used it lately.      The only issues lately is of some post nasal drainage.  She uses her antihistamine and Singulair and Flonase daily.      She does have a history of DALILA, but stopped wearing her CPAP years ago.  She is interested in repeating her sleep study.  She has witnessed snoring, poor sleep quality, fatigue, and daytime sleepiness.  She did feel better when she used it last time.        Patient Active Problem List   Diagnosis   • Allergic rhinitis   • Moderate persistent asthma without complication   • Diabetes mellitus (CMS/HCC)   • Hypertension   • Morbid obesity (CMS/HCC)   • DALILA (obstructive sleep apnea)   • GERD without esophagitis   • Combined systolic and diastolic congestive heart failure (CMS/HCC)   • Unspecified diastolic (congestive) heart failure (CMS/HCC)   • Snoring       Allergies   Allergen Reactions   • Sulfamethoxazole-Trimethoprim Unknown - Low Severity   • Tetanus-Diphtheria Toxoids Td Hives       Current Outpatient Medications:   •  albuterol sulfate  (90 Base) MCG/ACT inhaler, Inhale 2 puffs Every 4 (Four) Hours As Needed for Wheezing., Disp: 18 g, Rfl: 5  •  aspirin 81 MG tablet, Take 1 tablet by mouth daily., Disp: , Rfl:   •  atorvastatin (LIPITOR) 40 MG tablet, TK 1 T PO HS, Disp: , Rfl: 6  •  Blood Glucose Monitoring Suppl (ONE TOUCH ULTRA MINI) W/DEVICE kit, , Disp: , Rfl:   •  BREO ELLIPTA 200-25 MCG/INH inhaler, Inhale 1 puff Daily., Disp: 2 inhaler, Rfl: 1  •  CALCIUM 600+D 600-200 MG-UNIT per tablet, TK 2 TS PO QD, Disp: , Rfl: 11  •  Calcium 600-200 MG-UNIT per tablet, Take 1  tablet by mouth daily., Disp: , Rfl:   •  Cholecalciferol (VITAMIN D PO), Take  by mouth., Disp: , Rfl:   •  fluticasone (FLONASE) 50 MCG/ACT nasal spray, fluticasone propionate 50 mcg/actuation nasal spray,suspension, Disp: , Rfl:   •  Fluticasone Furoate-Vilanterol (BREO ELLIPTA) 100-25 MCG/INH inhaler, Inhale 1 puff Daily. 1 inhalation once a day, Disp: 1 each, Rfl: 11  •  furosemide (LASIX) 20 MG tablet, Take 1 tablet by mouth daily as needed., Disp: , Rfl:   •  glucose blood (ONE TOUCH ULTRA TEST) test strip, OneTouch Ultra Blue In Vitro Strip; Patient Sig: OneTouch Ultra Blue In Vitro Strip ; 50; 0; 07-Mar-2014; Active, Disp: , Rfl:   •  guaiFENesin (MUCINEX) 600 MG 12 hr tablet, Every 12 (Twelve) Hours., Disp: , Rfl:   •  ibuprofen (ADVIL,MOTRIN) 800 MG tablet, TK ONE T PO BID, Disp: , Rfl:   •  lansoprazole (PREVACID) 30 MG capsule, Take 1 capsule by mouth Daily., Disp: 30 capsule, Rfl: 5  •  levocetirizine (XYZAL) 5 MG tablet, levocetirizine 5 mg tablet, Disp: , Rfl:   •  losartan (COZAAR) 100 MG tablet, Take  by mouth Daily., Disp: , Rfl:   •  losartan (COZAAR) 50 MG tablet, Take 50 mg by mouth Daily., Disp: , Rfl:   •  metFORMIN (GLUCOPHAGE) 1000 MG tablet, Take 1 tablet by mouth 2 (two) times a day., Disp: , Rfl:   •  montelukast (SINGULAIR) 10 MG tablet, Take 1 pill daily, Disp: 30 tablet, Rfl: 11  •  omeprazole (priLOSEC) 20 MG capsule, Take 20 mg by mouth Daily., Disp: , Rfl:   •  ondansetron ODT (ZOFRAN-ODT) 4 MG disintegrating tablet, TK ONE T PO TID, Disp: , Rfl:   •  potassium chloride (K-DUR,KLOR-CON) 10 MEQ CR tablet, Take 20 mEq by mouth Daily., Disp: , Rfl:   •  potassium chloride (K-DUR,KLOR-CON) 20 MEQ CR tablet, Take  by mouth Daily., Disp: , Rfl:   •  azithromycin (ZITHROMAX) 250 MG tablet, Take 250 mg by mouth Daily., Disp: , Rfl:   •  predniSONE (DELTASONE) 20 MG tablet, TK 1 T PO QD STARTING ON 10-21-19, Disp: , Rfl: 0  •  predniSONE (DELTASONE) 20 MG tablet, prednisone 20 mg tablet   "Take 2 tablets every day by oral route for 3 days., Disp: , Rfl:   MEDICATION LIST AND ALLERGIES REVIEWED.    Social History     Tobacco Use   • Smoking status: Never Smoker   Substance Use Topics   • Alcohol use: No   • Drug use: No       FAMILY AND SOCIAL HISTORY REVIEWED.    Review of Systems   Constitutional: Negative for chills, fatigue, fever and unexpected weight change.   HENT: Positive for postnasal drip. Negative for congestion, nosebleeds, rhinorrhea, sinus pressure and trouble swallowing.    Respiratory: Negative for cough, chest tightness, shortness of breath and wheezing.    Cardiovascular: Negative for chest pain and leg swelling.   Gastrointestinal: Negative for abdominal pain, constipation, diarrhea, nausea and vomiting.   Genitourinary: Negative for dysuria, frequency, hematuria and urgency.   Musculoskeletal: Negative for myalgias.   Neurological: Negative for dizziness, weakness, numbness and headaches.   All other systems reviewed and are negative.  .  Objective   /80 (BP Location: Left arm, Patient Position: Sitting, Cuff Size: Adult)   Pulse 85   Temp 97.3 °F (36.3 °C)   Resp 16   Ht 157.5 cm (62\")   Wt 92.1 kg (203 lb)   LMP  (LMP Unknown)   SpO2 99% Comment: room air at rest  BMI 37.13 kg/m²     Immunization History   Administered Date(s) Administered   • Fluzone High Dose =>65 Years ([employer] ONLY) 10/20/2018   • Hep A / Hep B 12/20/2018   • Pneumococcal Conjugate 13-Valent (PCV13) 11/20/2018       Physical Exam   Constitutional: She is oriented to person, place, and time. She appears well-developed and well-nourished.   HENT:   Head: Normocephalic and atraumatic.   Eyes: Pupils are equal, round, and reactive to light. EOM are normal.   Neck: Normal range of motion. Neck supple.   Cardiovascular: Normal rate and regular rhythm.   No murmur heard.  Pulmonary/Chest: Effort normal and breath sounds normal. No respiratory distress. She has no wheezes. She has no rales.   Abdominal: " Soft. Bowel sounds are normal. She exhibits no distension.   Musculoskeletal: Normal range of motion. She exhibits no edema.   Neurological: She is alert and oriented to person, place, and time.   Skin: Skin is warm and dry. No erythema.   Psychiatric: She has a normal mood and affect. Her behavior is normal.   Vitals reviewed.        RESULTS      Assessment/Plan     PROBLEM LIST    Problem List Items Addressed This Visit        Cardiovascular and Mediastinum    Combined systolic and diastolic congestive heart failure (CMS/HCC)       Respiratory    Allergic rhinitis    Relevant Medications    ibuprofen (ADVIL,MOTRIN) 800 MG tablet    Moderate persistent asthma without complication - Primary    DALILA (obstructive sleep apnea)    Overview     Description: A.  On CPAP therapy in 2010; some in 2011 and 12; then gradually stopped wearing it.                  DISCUSSION    At this time her asthma is well controlled.  Continue on the Breo daily and Ventolin if needed.      Continue on her allergy regiment.     We will get her scheduled for a repeat sleep study for her DALILA.  She is agreeable to restarting therapy if indicated.      Follow up in 6 months with full PFTs.     I spent 25 minutes face to face with the patient. I spent > 50% percent of this time counseling and discussing evaluation, current status, treatment options and management.    ESSIE Quispe  06/01/20208:38 AM  Electronically signed     Please note that portions of this note were completed with a voice recognition program. Efforts were made to edit the dictations, but occasionally words are mistranscribed.      CC: Erlinda Monterroso, ESSIE

## 2020-06-02 DIAGNOSIS — G47.33 OSA (OBSTRUCTIVE SLEEP APNEA): Primary | ICD-10-CM

## 2020-06-03 DIAGNOSIS — G47.33 OSA (OBSTRUCTIVE SLEEP APNEA): Primary | ICD-10-CM

## 2020-08-04 DIAGNOSIS — G47.33 OSA (OBSTRUCTIVE SLEEP APNEA): Primary | ICD-10-CM

## 2020-09-06 ENCOUNTER — APPOINTMENT (OUTPATIENT)
Dept: PREADMISSION TESTING | Facility: HOSPITAL | Age: 65
End: 2020-09-06

## 2020-09-06 PROCEDURE — U0004 COV-19 TEST NON-CDC HGH THRU: HCPCS

## 2020-09-06 PROCEDURE — C9803 HOPD COVID-19 SPEC COLLECT: HCPCS

## 2020-09-07 LAB — SARS-COV-2 RNA NOSE QL NAA+PROBE: NOT DETECTED

## 2020-09-09 ENCOUNTER — HOSPITAL ENCOUNTER (OUTPATIENT)
Dept: SLEEP MEDICINE | Facility: HOSPITAL | Age: 65
Discharge: HOME | End: 2020-09-09
Admitting: NURSE PRACTITIONER

## 2020-09-09 VITALS
HEART RATE: 97 BPM | WEIGHT: 201.06 LBS | SYSTOLIC BLOOD PRESSURE: 154 MMHG | BODY MASS INDEX: 37 KG/M2 | OXYGEN SATURATION: 92 % | HEIGHT: 62 IN | DIASTOLIC BLOOD PRESSURE: 73 MMHG

## 2020-09-09 DIAGNOSIS — G47.33 OSA (OBSTRUCTIVE SLEEP APNEA): ICD-10-CM

## 2020-09-09 PROCEDURE — 95810 POLYSOM 6/> YRS 4/> PARAM: CPT | Performed by: INTERNAL MEDICINE

## 2020-09-09 PROCEDURE — 95810 POLYSOM 6/> YRS 4/> PARAM: CPT

## 2020-09-15 ENCOUNTER — TELEPHONE (OUTPATIENT)
Dept: PULMONOLOGY | Facility: CLINIC | Age: 65
End: 2020-09-15

## 2020-09-15 NOTE — TELEPHONE ENCOUNTER
Called patient regarding her sleep study results.  No evidence of DALILA or hypoxemia on her study.

## 2020-09-30 ENCOUNTER — OFFICE VISIT (OUTPATIENT)
Dept: PULMONOLOGY | Facility: CLINIC | Age: 65
End: 2020-09-30

## 2020-09-30 VITALS
WEIGHT: 204.8 LBS | HEIGHT: 62 IN | HEART RATE: 83 BPM | OXYGEN SATURATION: 98 % | TEMPERATURE: 97.3 F | DIASTOLIC BLOOD PRESSURE: 80 MMHG | SYSTOLIC BLOOD PRESSURE: 138 MMHG | BODY MASS INDEX: 37.69 KG/M2

## 2020-09-30 DIAGNOSIS — J45.40 MODERATE PERSISTENT ASTHMA WITHOUT COMPLICATION: Primary | ICD-10-CM

## 2020-09-30 DIAGNOSIS — G47.33 OSA (OBSTRUCTIVE SLEEP APNEA): ICD-10-CM

## 2020-09-30 DIAGNOSIS — J30.2 SEASONAL ALLERGIC RHINITIS, UNSPECIFIED TRIGGER: ICD-10-CM

## 2020-09-30 PROCEDURE — 99214 OFFICE O/P EST MOD 30 MIN: CPT | Performed by: NURSE PRACTITIONER

## 2020-09-30 RX ORDER — IPRATROPIUM BROMIDE 42 UG/1
2 SPRAY, METERED NASAL 4 TIMES DAILY
COMMUNITY
End: 2021-01-11

## 2020-09-30 RX ORDER — SUCRALFATE 1 G/1
1 TABLET ORAL AS NEEDED
COMMUNITY
End: 2021-10-15

## 2020-09-30 RX ORDER — AZITHROMYCIN 250 MG/1
TABLET, FILM COATED ORAL
Qty: 6 TABLET | Refills: 0 | Status: SHIPPED | OUTPATIENT
Start: 2020-09-30 | End: 2020-11-10

## 2020-09-30 RX ORDER — PREDNISONE 10 MG/1
TABLET ORAL
Qty: 31 TABLET | Refills: 0 | Status: SHIPPED | OUTPATIENT
Start: 2020-09-30 | End: 2020-11-10

## 2020-09-30 NOTE — PROGRESS NOTES
[facility] Pulmonary Follow up    CHIEF COMPLAINT    Cough, congestion, wheezing    Subjective   HISTORY OF PRESENT ILLNESS    Dorene Huizar is a 65 y.o.female here today for worsening cough and congestion.  For the last several days she has been having some itchy eyes, postnasal drainage, with worsening cough and congestion.  She has been using her Breo 200 daily.  She is also on Singulair and Zyrtec.  She has tried Flonase in the past but had difficulty using the nasal spray.  Currently she does use an epidurogram nasal spray as needed.    She has been using her albuterol rescue inhaler for some shortness of breath and wheezing.  She denies any fevers or chills.    She states her reflux is well controlled currently on her PPI.  She has not had to use her as needed Carafate or Zofran.    She does have a history of some sleep apnea and had been on and off CPAP in the past.  She underwent a sleep study in September with no clear indications of obstructive sleep apnea.  She did have poor sleep quality but her AHI was within normal limits.  She had no evidence of nocturnal hypoxemia.      Patient Active Problem List   Diagnosis   • Allergic rhinitis   • Moderate persistent asthma without complication   • Diabetes mellitus (CMS/HCC)   • Hypertension   • Morbid obesity (CMS/HCC)   • DALILA (obstructive sleep apnea)   • GERD without esophagitis   • Combined systolic and diastolic congestive heart failure (CMS/HCC)   • Unspecified diastolic (congestive) heart failure (CMS/HCC)   • Snoring       Allergies   Allergen Reactions   • Sulfamethoxazole-Trimethoprim Unknown - Low Severity   • Tetanus-Diphtheria Toxoids Td Hives       Current Outpatient Medications:   •  albuterol sulfate  (90 Base) MCG/ACT inhaler, Inhale 2 puffs Every 4 (Four) Hours As Needed for Wheezing., Disp: 18 g, Rfl: 5  •  aspirin 81 MG tablet, Take 1 tablet by mouth daily., Disp: , Rfl:   •  atorvastatin (LIPITOR) 40 MG tablet, TK 1 T PO HS, Disp: ,  Rfl: 6  •  Blood Glucose Monitoring Suppl (ONE TOUCH ULTRA MINI) W/DEVICE kit, , Disp: , Rfl:   •  Breo Ellipta 200-25 MCG/INH inhaler, Inhale 1 puff Daily., Disp: 1 inhaler, Rfl: 11  •  furosemide (LASIX) 20 MG tablet, Take 1 tablet by mouth daily as needed., Disp: , Rfl:   •  glucose blood (ONE TOUCH ULTRA TEST) test strip, OneTouch Ultra Blue In Vitro Strip; Patient Sig: OneTouch Ultra Blue In Vitro Strip ; 50; 0; 07-Mar-2014; Active, Disp: , Rfl:   •  ibuprofen (ADVIL,MOTRIN) 800 MG tablet, TK ONE T PO BID, Disp: , Rfl:   •  ipratropium (ATROVENT) 0.06 % nasal spray, 2 sprays into the nostril(s) as directed by provider 4 (Four) Times a Day., Disp: , Rfl:   •  levocetirizine (XYZAL) 5 MG tablet, levocetirizine 5 mg tablet, Disp: , Rfl:   •  losartan (COZAAR) 100 MG tablet, Take  by mouth Daily., Disp: , Rfl:   •  metFORMIN (GLUCOPHAGE) 1000 MG tablet, Take 1 tablet by mouth 2 (two) times a day., Disp: , Rfl:   •  montelukast (SINGULAIR) 10 MG tablet, Take 1 pill daily, Disp: 30 tablet, Rfl: 11  •  omeprazole (priLOSEC) 20 MG capsule, Take 20 mg by mouth Daily., Disp: , Rfl:   •  ondansetron ODT (ZOFRAN-ODT) 4 MG disintegrating tablet, TK ONE T PO TID, Disp: , Rfl:   •  potassium chloride (K-DUR,KLOR-CON) 20 MEQ CR tablet, Take  by mouth Daily., Disp: , Rfl:   •  sucralfate (CARAFATE) 1 g tablet, Take 1 g by mouth As Needed., Disp: , Rfl:   •  azithromycin (ZITHROMAX) 250 MG tablet, Take 2 by mouth today then 1 daily for 4 days, Disp: 6 tablet, Rfl: 0  •  CALCIUM 600+D 600-200 MG-UNIT per tablet, TK 2 TS PO QD, Disp: , Rfl: 11  •  guaiFENesin (MUCINEX) 600 MG 12 hr tablet, Every 12 (Twelve) Hours., Disp: , Rfl:   •  predniSONE (DELTASONE) 10 MG tablet, Take 4 tabs daily x 3 days, then take 3 tabs daily x 3 days, then take 2 tabs daily x 3 days, then take 1 tab daily x 3 days, Disp: 31 tablet, Rfl: 0  MEDICATION LIST AND ALLERGIES REVIEWED.    Social History     Tobacco Use   • Smoking status: Never Smoker  "  Substance Use Topics   • Alcohol use: No   • Drug use: No       FAMILY AND SOCIAL HISTORY REVIEWED.    Review of Systems   Constitutional: Negative for chills, fatigue, fever and unexpected weight change.   HENT: Positive for congestion and postnasal drip. Negative for nosebleeds, rhinorrhea, sinus pressure and trouble swallowing.    Eyes: Positive for itching.   Respiratory: Positive for chest tightness, shortness of breath and wheezing. Negative for cough.    Cardiovascular: Negative for chest pain and leg swelling.   Gastrointestinal: Negative for abdominal pain, constipation, diarrhea, nausea and vomiting.   Genitourinary: Negative for dysuria, frequency, hematuria and urgency.   Musculoskeletal: Negative for arthralgias and myalgias.   Allergic/Immunologic: Positive for environmental allergies.   Neurological: Negative for dizziness, weakness, numbness and headaches.   All other systems reviewed and are negative.  .  Objective   /80 (BP Location: Left arm, Patient Position: Sitting, Cuff Size: Adult)   Pulse 83   Temp 97.3 °F (36.3 °C) (Temporal)   Ht 157.5 cm (62\")   Wt 92.9 kg (204 lb 12.8 oz)   LMP  (LMP Unknown)   SpO2 98% Comment: room air seated  BMI 37.46 kg/m²     Immunization History   Administered Date(s) Administered   • Fluzone High Dose =>65 Years ([employer] ONLY) 10/20/2018   • Hep A / Hep B 12/20/2018   • Pneumococcal Conjugate 13-Valent (PCV13) 11/20/2018       Physical Exam  Vitals signs reviewed.   Constitutional:       Appearance: She is well-developed.   HENT:      Head: Normocephalic and atraumatic.   Eyes:      Pupils: Pupils are equal, round, and reactive to light.   Neck:      Musculoskeletal: Normal range of motion and neck supple.   Cardiovascular:      Rate and Rhythm: Normal rate and regular rhythm.      Heart sounds: No murmur.   Pulmonary:      Effort: Pulmonary effort is normal. No respiratory distress.      Breath sounds: Wheezing present. No rales.   Abdominal:      " General: Bowel sounds are normal. There is no distension.      Palpations: Abdomen is soft.   Musculoskeletal: Normal range of motion.   Skin:     General: Skin is warm and dry.      Findings: No erythema.   Neurological:      Mental Status: She is alert and oriented to person, place, and time.   Psychiatric:         Behavior: Behavior normal.           RESULTS    Sleep Study  Impression:      Patient's sleep efficiency was mildly reduced at 82%.  No delta wave sleep was recorded.  There was a decrease in the total amount of REM sleep.  The AHI was within normal limits.  It was, however, significantly elevated during REM sleep.  It is difficult to say if there was a positional predilection as all of the patient's REM sleep occurred in the supine position.     Oxygen saturations averaged 95% with a minimum saturation of 84% and oxygen saturations were below 88% for only 1.4 minutes throughout the night.     The periodic limb movement index was not elevated.     Significant or prolonged arrhythmias were not noted.     The majority of the arousals appeared spontaneous throughout the night.     Overall this study is not diagnostic of obstructive sleep apnea with the only significant caveat being that there was a decrease in the amount of REM sleep and the REM AHI index was elevated so the total amount of sleep apnea may be underestimated.     Plan:      I do note in the patient's record that she had a normal home sleep study in 2019 as well.  Therefore, further study repetition would seem to be of low yield.      Follow-up:    ESSIE Eastman     Electronically signed by:  Miguel Angel Golden MD 09/13/20 22:27 EDT      Assessment/Plan     PROBLEM LIST         ICD-10-CM ICD-9-CM   1. Moderate persistent asthma without complication  J45.40 493.90   2. Seasonal allergic rhinitis, unspecified trigger  J30.2 477.9   3. DALILA (obstructive sleep apnea)  G47.33 327.23       Problem List Items Addressed This Visit         Respiratory    Allergic rhinitis    Relevant Medications    predniSONE (DELTASONE) 10 MG tablet    Moderate persistent asthma without complication - Primary    Relevant Medications    Breo Ellipta 200-25 MCG/INH inhaler    DALILA (obstructive sleep apnea)    Overview     Description: A.  On CPAP therapy in 2010; some in 2011 and 12; then gradually stopped wearing it.                  DISCUSSION    It does appear she is having asthma exacerbation secondary to her seasonal allergies.  She will continue on her Zyrtec and Singulair daily.  I will go ahead and give her a prednisone taper as well as a course of azithromycin.  Continue on her Breo 200 daily.  I did suggest she could take some Mucinex couple times a day to help with secretion clearance.    Due for routine follow-up in December for full PFTs and a chest x-ray.      I spent 25 minutes face to face with the patient. I spent > 50% percent of this time counseling and discussing diagnostic testing, evaluation, current status, treatment options and management.    ESSIE Quispe  09/30/202010:13 EDT  Electronically signed     Please note that portions of this note were completed with a voice recognition program. Efforts were made to edit the dictations, but occasionally words are mistranscribed.      CC: Erlinda Monterroso, ESSIE

## 2020-11-09 ENCOUNTER — TELEPHONE (OUTPATIENT)
Dept: PULMONOLOGY | Facility: CLINIC | Age: 65
End: 2020-11-09

## 2020-11-09 NOTE — TELEPHONE ENCOUNTER
Pt called today requesting an apt due to having worsening cough/congestion x 3-4 days. Pt denies fever/chest pain/sore throat. Pt scheduled for 11/10 w/ Emily. Pt notified and verbalized understanding.

## 2020-11-10 ENCOUNTER — TELEPHONE (OUTPATIENT)
Dept: PULMONOLOGY | Facility: CLINIC | Age: 65
End: 2020-11-10

## 2020-11-10 ENCOUNTER — OFFICE VISIT (OUTPATIENT)
Dept: PULMONOLOGY | Facility: CLINIC | Age: 65
End: 2020-11-10

## 2020-11-10 VITALS
BODY MASS INDEX: 36.91 KG/M2 | HEIGHT: 62 IN | DIASTOLIC BLOOD PRESSURE: 82 MMHG | SYSTOLIC BLOOD PRESSURE: 120 MMHG | OXYGEN SATURATION: 95 % | WEIGHT: 200.6 LBS | TEMPERATURE: 97.7 F | HEART RATE: 96 BPM

## 2020-11-10 DIAGNOSIS — R05.9 COUGH: Primary | ICD-10-CM

## 2020-11-10 DIAGNOSIS — J45.40 MODERATE PERSISTENT ASTHMA WITHOUT COMPLICATION: ICD-10-CM

## 2020-11-10 DIAGNOSIS — J45.41 MODERATE PERSISTENT ASTHMA WITH ACUTE EXACERBATION: ICD-10-CM

## 2020-11-10 DIAGNOSIS — J30.89 ENVIRONMENTAL AND SEASONAL ALLERGIES: ICD-10-CM

## 2020-11-10 PROCEDURE — 99213 OFFICE O/P EST LOW 20 MIN: CPT | Performed by: NURSE PRACTITIONER

## 2020-11-10 RX ORDER — PREDNISONE 10 MG/1
TABLET ORAL
Qty: 31 TABLET | Refills: 0 | Status: SHIPPED | OUTPATIENT
Start: 2020-11-10 | End: 2020-12-17

## 2020-11-10 RX ORDER — ALBUTEROL SULFATE 90 UG/1
2 AEROSOL, METERED RESPIRATORY (INHALATION) EVERY 4 HOURS PRN
Qty: 18 G | Refills: 5 | Status: SHIPPED | OUTPATIENT
Start: 2020-11-10 | End: 2021-10-15 | Stop reason: SDUPTHER

## 2020-11-10 RX ORDER — AMOXICILLIN AND CLAVULANATE POTASSIUM 875; 125 MG/1; MG/1
1 TABLET, FILM COATED ORAL 2 TIMES DAILY
Qty: 20 TABLET | Refills: 0 | Status: SHIPPED | OUTPATIENT
Start: 2020-11-10 | End: 2020-12-17

## 2020-11-10 NOTE — TELEPHONE ENCOUNTER
Patient called today regarding a positive COVID test.   She had a test done at a free clinic on monday and someone just called to tell her it was positive.    She had not informed us she had a pending test when she made the apt or when she came in today.    We discussed she will need to be in quarantine, no leaving her home for 10 days.  She lives alone.  We discussed the HD will follow up on contact tracing as well.

## 2020-11-10 NOTE — PROGRESS NOTES
[facility] Follow up    CHIEF COMPLAINT    Worsening cough and congestion    Subjective   HISTORY OF PRESENT ILLNESS    Dorene Huizar is a 65 y.o.female here today for worsening cough and congestion with shortness of breath and wheezing.    I saw her in September as well for acute exacerbation she completed a course of antibiotics and steroids.  She did improve after the antibiotics and steroids but the symptoms including cough, congestion, eye irritation, and ear pain returned in the last couple days.    She usually has these episodes in the fall before winter secondary to seasonal allergies.  She denies any fevers or chills.    She does have a remote history of struct of sleep apnea but stopped using any PAP years ago due to some poor sleep quality and daytime sleepiness she repeated her sleep study with no significant evidence of obstructive sleep apnea or nocturnal hypoxemia.    Patient Active Problem List   Diagnosis   • Allergic rhinitis   • Moderate persistent asthma without complication   • Diabetes mellitus (CMS/HCC)   • Hypertension   • Morbid obesity (CMS/HCC)   • DALILA (obstructive sleep apnea)   • GERD without esophagitis   • Combined systolic and diastolic congestive heart failure (CMS/HCC)   • Unspecified diastolic (congestive) heart failure (CMS/HCC)   • Snoring   • Environmental and seasonal allergies       Allergies   Allergen Reactions   • Sulfamethoxazole-Trimethoprim Unknown - Low Severity   • Tetanus-Diphtheria Toxoids Td Hives       Current Outpatient Medications:   •  albuterol sulfate  (90 Base) MCG/ACT inhaler, Inhale 2 puffs Every 4 (Four) Hours As Needed for Wheezing., Disp: 18 g, Rfl: 5  •  aspirin 81 MG tablet, Take 1 tablet by mouth daily., Disp: , Rfl:   •  atorvastatin (LIPITOR) 40 MG tablet, TK 1 T PO HS, Disp: , Rfl: 6  •  Blood Glucose Monitoring Suppl (ONE TOUCH ULTRA MINI) W/DEVICE kit, , Disp: , Rfl:   •  Breo Ellipta 200-25 MCG/INH inhaler, Inhale 1 puff Daily.,  Disp: 1 inhaler, Rfl: 11  •  CALCIUM 600+D 600-200 MG-UNIT per tablet, TK 2 TS PO QD, Disp: , Rfl: 11  •  furosemide (LASIX) 20 MG tablet, Take 1 tablet by mouth daily as needed., Disp: , Rfl:   •  glucose blood (ONE TOUCH ULTRA TEST) test strip, OneTouch Ultra Blue In Vitro Strip; Patient Sig: OneTouch Ultra Blue In Vitro Strip ; 50; 0; 07-Mar-2014; Active, Disp: , Rfl:   •  ibuprofen (ADVIL,MOTRIN) 800 MG tablet, TK ONE T PO BID, Disp: , Rfl:   •  ipratropium (ATROVENT) 0.06 % nasal spray, 2 sprays into the nostril(s) as directed by provider 4 (Four) Times a Day., Disp: , Rfl:   •  levocetirizine (XYZAL) 5 MG tablet, levocetirizine 5 mg tablet, Disp: , Rfl:   •  losartan (COZAAR) 100 MG tablet, Take  by mouth Daily., Disp: , Rfl:   •  metFORMIN (GLUCOPHAGE) 1000 MG tablet, Take 1 tablet by mouth 2 (two) times a day., Disp: , Rfl:   •  montelukast (SINGULAIR) 10 MG tablet, Take 1 pill daily, Disp: 30 tablet, Rfl: 11  •  omeprazole (priLOSEC) 20 MG capsule, Take 20 mg by mouth Daily., Disp: , Rfl:   •  ondansetron ODT (ZOFRAN-ODT) 4 MG disintegrating tablet, TK ONE T PO TID, Disp: , Rfl:   •  potassium chloride (K-DUR,KLOR-CON) 20 MEQ CR tablet, Take  by mouth Daily., Disp: , Rfl:   •  sucralfate (CARAFATE) 1 g tablet, Take 1 g by mouth As Needed., Disp: , Rfl:   •  amoxicillin-clavulanate (AUGMENTIN) 875-125 MG per tablet, Take 1 tablet by mouth 2 (Two) Times a Day., Disp: 20 tablet, Rfl: 0  •  guaiFENesin (MUCINEX) 600 MG 12 hr tablet, Every 12 (Twelve) Hours., Disp: , Rfl:   •  predniSONE (DELTASONE) 10 MG tablet, Take 4 tabs daily x 3 days, then take 3 tabs daily x 3 days, then take 2 tabs daily x 3 days, then take 1 tab daily x 3 days, Disp: 31 tablet, Rfl: 0  MEDICATION LIST AND ALLERGIES REVIEWED.    Social History     Tobacco Use   • Smoking status: Never Smoker   Substance Use Topics   • Alcohol use: No   • Drug use: No       FAMILY AND SOCIAL HISTORY REVIEWED.    Review of Systems   Constitutional:  "Positive for activity change and fatigue. Negative for chills, fever and unexpected weight change.   HENT: Positive for congestion, ear discharge, postnasal drip, sinus pressure and sinus pain. Negative for nosebleeds, rhinorrhea and trouble swallowing.    Respiratory: Positive for cough and shortness of breath. Negative for chest tightness and wheezing.    Cardiovascular: Negative for chest pain and leg swelling.   Gastrointestinal: Negative for abdominal pain, constipation, diarrhea, nausea and vomiting.   Genitourinary: Negative for dysuria, frequency, hematuria and urgency.   Musculoskeletal: Negative for myalgias.   Neurological: Negative for dizziness, weakness, numbness and headaches.   All other systems reviewed and are negative.  .  Objective   /82   Pulse 96   Temp 97.7 °F (36.5 °C)   Ht 157.5 cm (62\")   Wt 91 kg (200 lb 9.6 oz)   LMP  (LMP Unknown)   SpO2 95% Comment: resting, room air  Breastfeeding No   BMI 36.69 kg/m²     Immunization History   Administered Date(s) Administered   • Fluzone High Dose =>65 Years ([employer] ONLY) 10/20/2018, 11/21/2019, 09/02/2020   • Hep A / Hep B 12/20/2018   • Hep A, Unspecified 11/14/2018   • Hepatitis A 11/14/2018   • Pneumococcal Conjugate 13-Valent (PCV13) 11/20/2018, 11/21/2019   • Pneumococcal Polysaccharide (PPSV23) 08/01/2018, 08/13/2018       Physical Exam  Vitals signs reviewed.   Constitutional:       Appearance: She is well-developed.   HENT:      Head: Normocephalic and atraumatic.   Eyes:      Pupils: Pupils are equal, round, and reactive to light.   Neck:      Musculoskeletal: Normal range of motion and neck supple.   Cardiovascular:      Rate and Rhythm: Normal rate and regular rhythm.      Heart sounds: No murmur.   Pulmonary:      Effort: Pulmonary effort is normal. No respiratory distress.      Breath sounds: Wheezing present. No rales.      Comments: Decreased breath sounds with expiratory wheezing  Abdominal:      General: Bowel sounds " are normal. There is no distension.      Palpations: Abdomen is soft.   Musculoskeletal: Normal range of motion.   Skin:     General: Skin is warm and dry.      Findings: No erythema.   Neurological:      Mental Status: She is alert and oriented to person, place, and time.   Psychiatric:         Behavior: Behavior normal.           RESULTS    PA and lateral chest x-ray done the office today reviewed by me  Awaiting final MD interpretation           Assessment/Plan     PROBLEM LIST         ICD-10-CM ICD-9-CM   1. Cough  R05 786.2   2. Moderate persistent asthma with acute exacerbation  J45.41 493.92   3. Moderate persistent asthma without complication  J45.40 493.90   4. Environmental and seasonal allergies  J30.89 477.8       Problem List Items Addressed This Visit        Respiratory    Moderate persistent asthma without complication    Relevant Medications    albuterol sulfate  (90 Base) MCG/ACT inhaler       Other    Environmental and seasonal allergies      Other Visit Diagnoses     Cough    -  Primary    Relevant Orders    XR Chest PA & Lateral            DISCUSSION    She does appear to have a acute exacerbation of her asthma related to her seasonal allergies.  Glad and put her on a course of Augmentin and prednisone taper.  Continue on the Breo daily and albuterol HFA as needed.  She does need a refill on that today.    Also recommend she follow back up with her allergist for allergy testing which may be beneficial to help with her exacerbations.    She will keep her follow-up on December 3 for her full PFTs for her routine follow-up.        I spent 10-19 minutes face to face with the patient. I spent > 50% percent of this time counseling and discussing diagnostic testing, evaluation, current status, treatment options and management.    Emily Rivero, APRN  11/10/297498:16 EST  Electronically signed     Please note that portions of this note were completed with a voice recognition program. Efforts  were made to edit the dictations, but occasionally words are mistranscribed.      CC: Erlinda Monterroso, APRN

## 2020-11-13 ENCOUNTER — TELEPHONE (OUTPATIENT)
Dept: PULMONOLOGY | Facility: CLINIC | Age: 65
End: 2020-11-13

## 2020-11-13 NOTE — TELEPHONE ENCOUNTER
You can let her know to complete her antibiotics and steroids.  The cough may last for several weeks especially since she has Covid.  She can use over-the-counter cough syrup such as Delsym.  If she has a nebulizer machine we can call her in some albuterol nebulizers.

## 2020-11-13 NOTE — TELEPHONE ENCOUNTER
Spoke with pt and states she went to [facility] ED last night. Pt advised to continue with abx/steroids as prescribed by Emily and pt states she was given Tylenol w/ Codeine for cough. Okay to take with abx/steroids only PRN. D/S requested. Pt verbalized understanding.

## 2020-11-13 NOTE — TELEPHONE ENCOUNTER
Pt called requesting a call back @ [phone] regarding her cough. Pt states that her cough is not getting any better and requesting a cough syrup and nebulizer treatment.

## 2020-11-18 RX ORDER — ALBUTEROL SULFATE 2.5 MG/3ML
2.5 SOLUTION RESPIRATORY (INHALATION) 4 TIMES DAILY PRN
Qty: 125 VIAL | Refills: 3 | Status: SHIPPED | OUTPATIENT
Start: 2020-11-18 | End: 2021-08-20 | Stop reason: SDUPTHER

## 2020-11-18 RX ORDER — GUAIFENESIN 600 MG/1
600 TABLET, EXTENDED RELEASE ORAL 2 TIMES DAILY
Qty: 60 TABLET | Refills: 1 | Status: SHIPPED | OUTPATIENT
Start: 2020-11-18 | End: 2021-09-03 | Stop reason: SDUPTHER

## 2020-11-18 NOTE — TELEPHONE ENCOUNTER
Pt called today requesting refill on Rx Mucinex 600 mg and Albuterol Neb Sol be sent to [facility] to help with her coughing up sputum.

## 2020-11-20 ENCOUNTER — TELEPHONE (OUTPATIENT)
Dept: PULMONOLOGY | Facility: CLINIC | Age: 65
End: 2020-11-20

## 2020-12-14 DIAGNOSIS — Z01.812 BLOOD TESTS PRIOR TO TREATMENT OR PROCEDURE: Primary | ICD-10-CM

## 2020-12-15 ENCOUNTER — CLINICAL SUPPORT (OUTPATIENT)
Dept: PULMONOLOGY | Facility: CLINIC | Age: 65
End: 2020-12-15

## 2020-12-15 DIAGNOSIS — Z01.812 BLOOD TESTS PRIOR TO TREATMENT OR PROCEDURE: ICD-10-CM

## 2020-12-15 PROCEDURE — 99000 SPECIMEN HANDLING OFFICE-LAB: CPT | Performed by: NURSE PRACTITIONER

## 2020-12-15 PROCEDURE — U0004 COV-19 TEST NON-CDC HGH THRU: HCPCS | Performed by: NURSE PRACTITIONER

## 2020-12-16 LAB — SARS-COV-2 RNA RESP QL NAA+PROBE: NORMAL

## 2020-12-17 ENCOUNTER — OFFICE VISIT (OUTPATIENT)
Dept: PULMONOLOGY | Facility: CLINIC | Age: 65
End: 2020-12-17

## 2020-12-17 VITALS
BODY MASS INDEX: 36.07 KG/M2 | WEIGHT: 196 LBS | HEIGHT: 62 IN | OXYGEN SATURATION: 91 % | SYSTOLIC BLOOD PRESSURE: 138 MMHG | DIASTOLIC BLOOD PRESSURE: 72 MMHG | HEART RATE: 105 BPM | TEMPERATURE: 96 F

## 2020-12-17 DIAGNOSIS — J30.89 ENVIRONMENTAL AND SEASONAL ALLERGIES: ICD-10-CM

## 2020-12-17 DIAGNOSIS — E66.01 MORBID OBESITY (HCC): ICD-10-CM

## 2020-12-17 DIAGNOSIS — J45.40 MODERATE PERSISTENT ASTHMA WITHOUT COMPLICATION: Primary | ICD-10-CM

## 2020-12-17 PROCEDURE — 99214 OFFICE O/P EST MOD 30 MIN: CPT | Performed by: NURSE PRACTITIONER

## 2020-12-17 NOTE — PROGRESS NOTES
[facility] Follow up    CHIEF COMPLAINT    Asthma, post COVID-19 infection follow-up    Subjective   HISTORY OF PRESENT ILLNESS    Dorene Huizar is a 65 y.o.female here today for follow-up after recent COVID-19 infection.  I saw her on November 10 with some worsening cough and congestion.  We found out later that she had been Covid tested the day before and it did come back positive.  She did up at  hospital with worsening hypoxia on November 14.  I do not have those records available today.  She states she was put on oxygen but did not require oxygen at discharge.    Today she still has a bit of cough and shortness of air with activity.  She is not back to baseline.  She does have underlying asthma which we follow-up here in the office.  She is also followed up with Dr Smart at [facility] allergy and asthma.    Currently she is on Breo, which she had been getting samples from this office.  She is on Symbicort 160 mcg from Dr. Smart's office, as well as Dulera which she received in the hospital at .  We did discuss that she does not need to be on all 3 of those combination ICS/LABA inhalers.    She is not using her nebulizers anymore.  She does have an albuterol she uses as needed.        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)   • Unspecified diastolic (congestive) heart failure (CMS/HCC)   • Snoring   • Environmental and seasonal allergies       Allergies   Allergen Reactions   • Sulfamethoxazole-Trimethoprim Unknown - Low Severity   • Tetanus-Diphtheria Toxoids Td Hives       Current Outpatient Medications:   •  albuterol (PROVENTIL) (2.5 MG/3ML) 0.083% nebulizer solution, Take 2.5 mg by nebulization 4 (Four) Times a Day As Needed for Wheezing., Disp:  125 vial, Rfl: 3  •  albuterol sulfate  (90 Base) MCG/ACT inhaler, Inhale 2 puffs Every 4 (Four) Hours As Needed for Wheezing., Disp: 18 g, Rfl: 5  •  aspirin 81 MG tablet, Take 1 tablet by mouth daily., Disp: , Rfl:   •  atorvastatin (LIPITOR) 40 MG tablet, TK 1 T PO HS, Disp: , Rfl: 6  •  Blood Glucose Monitoring Suppl (ONE TOUCH ULTRA MINI) W/DEVICE kit, , Disp: , Rfl:   •  Breo Ellipta 200-25 MCG/INH inhaler, Inhale 1 puff Daily., Disp: 1 inhaler, Rfl: 11  •  CALCIUM 600+D 600-200 MG-UNIT per tablet, TK 2 TS PO QD, Disp: , Rfl: 11  •  furosemide (LASIX) 20 MG tablet, Take 1 tablet by mouth daily as needed., Disp: , Rfl:   •  glucose blood (ONE TOUCH ULTRA TEST) test strip, OneTouch Ultra Blue In Vitro Strip; Patient Sig: OneTouch Ultra Blue In Vitro Strip ; 50; 0; 07-Mar-2014; Active, Disp: , Rfl:   •  guaiFENesin (Mucinex) 600 MG 12 hr tablet, Take 1 tablet by mouth 2 (Two) Times a Day., Disp: 60 tablet, Rfl: 1  •  ibuprofen (ADVIL,MOTRIN) 800 MG tablet, TK ONE T PO BID, Disp: , Rfl:   •  ipratropium (ATROVENT) 0.06 % nasal spray, 2 sprays into the nostril(s) as directed by provider 4 (Four) Times a Day., Disp: , Rfl:   •  levocetirizine (XYZAL) 5 MG tablet, levocetirizine 5 mg tablet, Disp: , Rfl:   •  losartan (COZAAR) 100 MG tablet, Take  by mouth Daily., Disp: , Rfl:   •  metFORMIN (GLUCOPHAGE) 1000 MG tablet, Take 1 tablet by mouth 2 (two) times a day., Disp: , Rfl:   •  montelukast (SINGULAIR) 10 MG tablet, Take 1 pill daily, Disp: 30 tablet, Rfl: 11  •  omeprazole (priLOSEC) 20 MG capsule, Take 20 mg by mouth Daily., Disp: , Rfl:   •  ondansetron ODT (ZOFRAN-ODT) 4 MG disintegrating tablet, TK ONE T PO TID, Disp: , Rfl:   •  potassium chloride (K-DUR,KLOR-CON) 20 MEQ CR tablet, Take  by mouth Daily., Disp: , Rfl:   •  sucralfate (CARAFATE) 1 g tablet, Take 1 g by mouth As Needed., Disp: , Rfl:   MEDICATION LIST AND ALLERGIES REVIEWED.    Social History     Tobacco Use   • Smoking status: Never  "Smoker   Substance Use Topics   • Alcohol use: No   • Drug use: No       FAMILY AND SOCIAL HISTORY REVIEWED.    Review of Systems   Constitutional: Positive for fatigue. Negative for chills, fever and unexpected weight change.   HENT: Negative for congestion, nosebleeds, postnasal drip, rhinorrhea, sinus pressure and trouble swallowing.    Respiratory: Positive for cough and shortness of breath. Negative for chest tightness and wheezing.    Cardiovascular: Negative for chest pain and leg swelling.   Gastrointestinal: Negative for abdominal pain, constipation, diarrhea, nausea and vomiting.   Genitourinary: Negative for dysuria, frequency, hematuria and urgency.   Musculoskeletal: Negative for myalgias.   Neurological: Negative for dizziness, weakness, numbness and headaches.   All other systems reviewed and are negative.  .  Objective   /72   Pulse 105   Temp 96 °F (35.6 °C)   Ht 157.5 cm (62\")   Wt 88.9 kg (196 lb)   LMP  (LMP Unknown)   SpO2 91% Comment: resting at room air  BMI 35.85 kg/m²     Immunization History   Administered Date(s) Administered   • Fluzone High Dose =>65 Years ([employer] ONLY) 10/20/2018, 11/21/2019, 09/02/2020   • Hep A / Hep B 12/20/2018   • Hep A, Unspecified 11/14/2018   • Hepatitis A 11/14/2018   • Pneumococcal Conjugate 13-Valent (PCV13) 11/20/2018, 11/21/2019   • Pneumococcal Polysaccharide (PPSV23) 08/01/2018, 08/13/2018       Physical Exam  Vitals signs reviewed.   Constitutional:       Appearance: She is well-developed.   HENT:      Head: Normocephalic and atraumatic.   Eyes:      Pupils: Pupils are equal, round, and reactive to light.   Neck:      Musculoskeletal: Normal range of motion and neck supple.   Cardiovascular:      Rate and Rhythm: Normal rate and regular rhythm.      Heart sounds: No murmur.   Pulmonary:      Effort: Pulmonary effort is normal. No respiratory distress.      Breath sounds: Normal breath sounds. No wheezing or rales.   Abdominal:      General: " Bowel sounds are normal. There is no distension.      Palpations: Abdomen is soft.   Musculoskeletal: Normal range of motion.   Skin:     General: Skin is warm and dry.      Findings: No erythema.   Neurological:      Mental Status: She is alert and oriented to person, place, and time.   Psychiatric:         Behavior: Behavior normal.           RESULTS    PFTS in the office today, read by me:  Unable to complete secondary to the COVID epidemic    PA and lateral chest x-ray done the office today reviewed by me  Awaiting final MD interpretation      Assessment/Plan     PROBLEM LIST         ICD-10-CM ICD-9-CM   1. Moderate persistent asthma without complication  J45.40 493.90   2. Environmental and seasonal allergies  J30.89 477.8   3. Morbid obesity (CMS/HCC)  E66.01 278.01       Problem List Items Addressed This Visit        Respiratory    Moderate persistent asthma without complication - Primary    Relevant Orders    XR Chest PA & Lateral       Digestive    Morbid obesity (CMS/HCC)       Other    Environmental and seasonal allergies            DISCUSSION    We went over her chest x-ray today.  She is recovering from her COVID-19 illness.  We will get the records from  regarding her hypoxia and follow-up chest x-rays and CTs if completed.  At this time she is not having any evidence of exertional hypoxemia.    Her cough and shortness of breath is almost to baseline.    I did give her written instructions on her current inhalers.  She did just purchase a new Symbicort.  She will finish out the Dulera from [facility] and then resume her Symbicort.  She will call and let me know which inhaler works the best and if she would like to continue on the Symbicort as her chronic maintenance therapy.  We did discuss that she does not need all 3 of those inhalers to be used at once.    Follow-up in 3 months with full PFTs.      I spent 20-29 minutes face to face with the patient. I spent > 50% percent of this time counseling and  discussing diagnostic testing, evaluation, current status, treatment options and management.    ESSIE Quispe  12/17/202010:39 EST  Electronically signed     Please note that portions of this note were completed with a voice recognition program. Efforts were made to edit the dictations, but occasionally words are mistranscribed.      CC: Erlinda Monterroso, APRN

## 2021-01-11 ENCOUNTER — OFFICE VISIT (OUTPATIENT)
Dept: PULMONOLOGY | Facility: CLINIC | Age: 66
End: 2021-01-11

## 2021-01-11 VITALS
HEART RATE: 107 BPM | HEIGHT: 62 IN | OXYGEN SATURATION: 97 % | SYSTOLIC BLOOD PRESSURE: 122 MMHG | WEIGHT: 196 LBS | DIASTOLIC BLOOD PRESSURE: 76 MMHG | TEMPERATURE: 97.8 F | BODY MASS INDEX: 36.07 KG/M2

## 2021-01-11 DIAGNOSIS — J45.40 MODERATE PERSISTENT ASTHMA WITHOUT COMPLICATION: Primary | ICD-10-CM

## 2021-01-11 DIAGNOSIS — Z86.16 HISTORY OF 2019 NOVEL CORONAVIRUS DISEASE (COVID-19): ICD-10-CM

## 2021-01-11 DIAGNOSIS — J30.89 ENVIRONMENTAL AND SEASONAL ALLERGIES: ICD-10-CM

## 2021-01-11 PROCEDURE — 94375 RESPIRATORY FLOW VOLUME LOOP: CPT | Performed by: NURSE PRACTITIONER

## 2021-01-11 PROCEDURE — 94729 DIFFUSING CAPACITY: CPT | Performed by: NURSE PRACTITIONER

## 2021-01-11 PROCEDURE — 94726 PLETHYSMOGRAPHY LUNG VOLUMES: CPT | Performed by: NURSE PRACTITIONER

## 2021-01-11 PROCEDURE — 99214 OFFICE O/P EST MOD 30 MIN: CPT | Performed by: NURSE PRACTITIONER

## 2021-01-11 RX ORDER — MOMETASONE FUROATE AND FORMOTEROL FUMARATE DIHYDRATE 200; 5 UG/1; UG/1
2 AEROSOL RESPIRATORY (INHALATION)
Qty: 1 INHALER | Refills: 11 | Status: SHIPPED | OUTPATIENT
Start: 2021-01-11 | End: 2021-04-12

## 2021-01-11 RX ORDER — METHIMAZOLE 5 MG/1
TABLET ORAL
COMMUNITY

## 2021-01-11 NOTE — PROGRESS NOTES
"[facility] [place] Pulmonary Follow up      Chief Complaint  Asthma and Shortness of Breath    Subjective          Dorene Huizar presents [facility] for follow-up on her asthma.  She did have COVID-19 diagnosed on November 10.  She did end up in the emergency department at  on November 14.  Since then she has slowly continued to recover.  Today she comes in for routine follow-up on her asthma.  I last saw her in the office on December 17.    She has had some trouble with her chronic maintenance inhaler regimen.  She has been getting samples from several offices as well as filling prescriptions.  On her last visit I gave her handwritten instructions to just take one of the combination inhaler she had at home.  She has been on Dulera and actually doing very well on it.    She is overall feeling back to baseline.  She has an occasional cough and congestion with occasional wheezing but her shortness of breath has significantly improved.  She no longer use any of her nebulizers at home.  She had very rare albuterol HFA use.      Objective     Vital Signs:   /76   Pulse 107   Temp 97.8 °F (36.6 °C)   Ht 157.5 cm (62\")   Wt 88.9 kg (196 lb)   SpO2 97% Comment: resting at room air  BMI 35.85 kg/m²       Physical Exam  Vitals signs reviewed.   Constitutional:       Appearance: She is well-developed.   HENT:      Head: Normocephalic and atraumatic.   Eyes:      Pupils: Pupils are equal, round, and reactive to light.   Neck:      Musculoskeletal: Normal range of motion and neck supple.   Cardiovascular:      Rate and Rhythm: Normal rate and regular rhythm.      Heart sounds: No murmur.   Pulmonary:      Effort: Pulmonary effort is normal. No respiratory distress.      Breath sounds: No wheezing or rales.      Comments: Decreased but no wheezing or rales  Abdominal:      General: Bowel sounds are normal. There is no distension.      Palpations: Abdomen is soft.   Musculoskeletal: " Normal range of motion.   Skin:     General: Skin is warm and dry.      Findings: No erythema.   Neurological:      Mental Status: She is alert and oriented to person, place, and time.   Psychiatric:         Behavior: Behavior normal.          Result Review :            PFTs done in the office today:  No change in her restrictive airway disease from 2018 with FVC of 1.56, 70% predicted and FEV1 of 1.18, 69% predicted and a ratio of 76%.  Normal adjusted DLCO             Assessment and Plan    Problem List Items Addressed This Visit        Other    Moderate persistent asthma without complication - Primary    Relevant Medications    mometasone-formoterol (Dulera) 200-5 MCG/ACT inhaler    Environmental and seasonal allergies    History of 2019 novel coronavirus disease (COVID-19)    Overview     November 2020             We went over her pulmonary function test today in the office.  There is been no significant changes since 2018.  It appears she has fully recovered from her COVID-19 and asthma exacerbation.    At this time she will continue on her Dulera twice daily with albuterol HFA as needed.    Continue on her antihistamine and nasal sprays as needed for her seasonal allergies.    Follow-up in 3 to 4 months, springtime as needed for any worsening symptoms of her asthma.            Follow Up     No follow-ups on file.  Patient was given instructions and counseling regarding her condition or for health maintenance advice. Please see specific information pulled into the AVS if appropriate.     ESSIE Eastman, ACNP  [facility]  Electronically signed

## 2021-01-14 ENCOUNTER — TELEPHONE (OUTPATIENT)
Dept: PULMONOLOGY | Facility: CLINIC | Age: 66
End: 2021-01-14

## 2021-04-12 ENCOUNTER — OFFICE VISIT (OUTPATIENT)
Dept: PULMONOLOGY | Facility: CLINIC | Age: 66
End: 2021-04-12

## 2021-04-12 VITALS
DIASTOLIC BLOOD PRESSURE: 100 MMHG | OXYGEN SATURATION: 93 % | HEART RATE: 89 BPM | SYSTOLIC BLOOD PRESSURE: 160 MMHG | WEIGHT: 207 LBS | HEIGHT: 62 IN | BODY MASS INDEX: 38.09 KG/M2 | TEMPERATURE: 97.5 F

## 2021-04-12 DIAGNOSIS — J30.89 ENVIRONMENTAL AND SEASONAL ALLERGIES: ICD-10-CM

## 2021-04-12 DIAGNOSIS — Z86.16 HISTORY OF 2019 NOVEL CORONAVIRUS DISEASE (COVID-19): ICD-10-CM

## 2021-04-12 DIAGNOSIS — J45.40 MODERATE PERSISTENT ASTHMA WITHOUT COMPLICATION: Primary | ICD-10-CM

## 2021-04-12 PROCEDURE — 99213 OFFICE O/P EST LOW 20 MIN: CPT | Performed by: NURSE PRACTITIONER

## 2021-04-12 RX ORDER — BUDESONIDE AND FORMOTEROL FUMARATE DIHYDRATE 160; 4.5 UG/1; UG/1
2 AEROSOL RESPIRATORY (INHALATION)
COMMUNITY
End: 2021-10-15 | Stop reason: SDUPTHER

## 2021-04-12 NOTE — PROGRESS NOTES
"[facility] Pulmonary Follow up      Chief Complaint  Asthma and Shortness of Breath    Subjective          Dorene Huizar presents to [facility] for routine follow-up on her asthma.  She has done well recently without any worsening wheezing, coughing, or shortness of breath.  She said no recent exacerbations use of antibiotics or steroids.    She did have COVID-19 infection in November, she is getting her second vaccine on the 28th.    Currently she is on Symbicort 160 and using twice daily.  She has used Dulera in the past, but her insurance covers the Symbicort.  She is actually doing very well.  She does occasionally use her albuterol for worsening shortness of breath and chest tightness but rarely.        Objective     Vital Signs:   /100   Pulse 89   Temp 97.5 °F (36.4 °C)   Ht 157.5 cm (62\")   Wt 93.9 kg (207 lb)   SpO2 93% Comment: resting atroom air  BMI 37.86 kg/m²       Immunization History   Administered Date(s) Administered   • COVID-19 (MODERNA) 03/31/2021   • Fluzone High Dose =>65 Years ([employer] ONLY) 10/20/2018, 11/21/2019, 09/02/2020   • Hep A / Hep B 12/20/2018   • Hep A, Unspecified 11/14/2018   • Hepatitis A 11/14/2018   • Pneumococcal Conjugate 13-Valent (PCV13) 11/20/2018, 11/21/2019   • Pneumococcal Polysaccharide (PPSV23) 08/01/2018, 08/13/2018       Physical Exam  Vitals reviewed.   Constitutional:       General: She is not in acute distress.     Appearance: She is well-developed. She is obese. She is not ill-appearing.   HENT:      Head: Normocephalic and atraumatic.   Eyes:      Pupils: Pupils are equal, round, and reactive to light.   Cardiovascular:      Rate and Rhythm: Normal rate and regular rhythm.      Heart sounds: No murmur heard.     Pulmonary:      Effort: Pulmonary effort is normal. No respiratory distress.      Breath sounds: Normal breath sounds. No wheezing or rales.   Abdominal:      General: Bowel sounds are " normal. There is no distension.      Palpations: Abdomen is soft.   Musculoskeletal:         General: Normal range of motion.      Cervical back: Normal range of motion and neck supple.   Skin:     General: Skin is warm and dry.      Findings: No erythema.   Neurological:      Mental Status: She is alert and oriented to person, place, and time.   Psychiatric:         Behavior: Behavior normal.          Result Review :                           Assessment and Plan    Problem List Items Addressed This Visit        Allergies and Adverse Reactions    Environmental and seasonal allergies       Pulmonary and Pneumonias    Moderate persistent asthma without complication - Primary    Relevant Medications    budesonide-formoterol (SYMBICORT) 160-4.5 MCG/ACT inhaler       Other    History of 2019 novel coronavirus disease (COVID-19)    Overview     November 2020               Continue on her Symbicort twice daily, with albuterol as needed.    Continue on her Singulair and antihistamine for her seasonal allergies.    Follow up as needed, or in 6 months with [place] and chest xray     I spent 25 minutes caring for Dorene on this date of service. This time includes time spent by me in the following activities:preparing for the visit, reviewing tests, obtaining and/or reviewing a separately obtained history, performing a medically appropriate examination and/or evaluation , counseling and educating the patient/family/caregiver, ordering medications, tests, or procedures, referring and communicating with other health care professionals  and documenting information in the medical record  Follow Up     Return in about 6 months (around 10/12/2021).  Patient was given instructions and counseling regarding her condition or for health maintenance advice. Please see specific information pulled into the AVS if appropriate.     Emily Rivero APRN, ACNP  [facility]  Electronically signed

## 2021-08-19 ENCOUNTER — TELEPHONE (OUTPATIENT)
Dept: PULMONOLOGY | Facility: CLINIC | Age: 66
End: 2021-08-19

## 2021-08-19 NOTE — TELEPHONE ENCOUNTER
It is fine to make her an earlier apt, tomorrow if she wants to be seen before the weekend.  No testing needed.

## 2021-08-19 NOTE — TELEPHONE ENCOUNTER
Pt called today c/o cough w/ thick yellow sputum/wheezing/sob for 3 days and requesting an apt to be seen. Pt denies fever/chest pain/sore throat. Pt has upcoming apt scheduled for 10/15 but wants to be seen sooner. Pt can be reached @ [phone]. Please advise.

## 2021-08-20 ENCOUNTER — OFFICE VISIT (OUTPATIENT)
Dept: PULMONOLOGY | Facility: CLINIC | Age: 66
End: 2021-08-20

## 2021-08-20 VITALS
BODY MASS INDEX: 40.3 KG/M2 | HEIGHT: 62 IN | TEMPERATURE: 97.8 F | OXYGEN SATURATION: 93 % | DIASTOLIC BLOOD PRESSURE: 90 MMHG | SYSTOLIC BLOOD PRESSURE: 160 MMHG | HEART RATE: 123 BPM | WEIGHT: 219 LBS

## 2021-08-20 DIAGNOSIS — I10 HYPERTENSION, UNSPECIFIED TYPE: ICD-10-CM

## 2021-08-20 DIAGNOSIS — J30.89 ENVIRONMENTAL AND SEASONAL ALLERGIES: ICD-10-CM

## 2021-08-20 DIAGNOSIS — R06.02 SOB (SHORTNESS OF BREATH): Primary | ICD-10-CM

## 2021-08-20 DIAGNOSIS — J45.40 MODERATE PERSISTENT ASTHMA WITHOUT COMPLICATION: ICD-10-CM

## 2021-08-20 DIAGNOSIS — J40 BRONCHITIS: ICD-10-CM

## 2021-08-20 PROCEDURE — 99213 OFFICE O/P EST LOW 20 MIN: CPT | Performed by: NURSE PRACTITIONER

## 2021-08-20 RX ORDER — ALBUTEROL SULFATE 2.5 MG/3ML
2.5 SOLUTION RESPIRATORY (INHALATION) 4 TIMES DAILY PRN
Qty: 90 EACH | Refills: 1 | Status: SHIPPED | OUTPATIENT
Start: 2021-08-20

## 2021-08-20 RX ORDER — PREDNISONE 10 MG/1
TABLET ORAL
Qty: 31 TABLET | Refills: 0 | Status: SHIPPED | OUTPATIENT
Start: 2021-08-20 | End: 2021-10-15

## 2021-08-20 RX ORDER — AZITHROMYCIN 250 MG/1
TABLET, FILM COATED ORAL
Qty: 6 TABLET | Refills: 0 | Status: SHIPPED | OUTPATIENT
Start: 2021-08-20 | End: 2021-10-15

## 2021-08-20 NOTE — PROGRESS NOTES
"[facility] Pulmonary Follow up      Chief Complaint  Asthma and Shortness of Breath    Subjective          Dorene Huizar presents to [facility] for evaluation of worsening cough and congestion.  She has been having worsening cough congestion the last several days.  She says she is just bringing up clear-colored phlegm.  She is also having quite a bit of postnasal drainage.  She has been using her Nasonex nasal spray as well as her Xyzal daily.    She denies any chest pain chest pressure palpitations.  Her blood pressure and heart rate are up quite a bit today, she states she has not been taking her blood pressure medicines recently.     She does have a history of COVID-19 last November and has had her vaccines.  She was around a friend last week who now has been tested positive for COVID-19.    Objective     Vital Signs:   /90   Pulse (!) 123   Temp 97.8 °F (36.6 °C)   Ht 157.5 cm (62\")   Wt 99.3 kg (219 lb)   SpO2 93% Comment: resting at room air  BMI 40.06 kg/m²       Immunization History   Administered Date(s) Administered   • COVID-19 (MODERNA) 03/31/2021, 04/28/2021   • COVID-19 (PFIZER) 03/31/2021, 04/28/2021   • Fluzone High Dose =>65 Years ([employer] ONLY) 10/20/2018, 11/21/2019, 09/02/2020   • Hep A / Hep B 12/20/2018   • Hep A, Unspecified 11/14/2018   • Hepatitis A 11/14/2018   • Pneumococcal Conjugate 13-Valent (PCV13) 11/20/2018, 11/21/2019   • Pneumococcal Polysaccharide (PPSV23) 08/01/2018, 08/13/2018       Physical Exam  Vitals reviewed.   Constitutional:       Appearance: She is well-developed. She is obese.   HENT:      Head: Normocephalic and atraumatic.   Eyes:      Pupils: Pupils are equal, round, and reactive to light.   Cardiovascular:      Rate and Rhythm: Normal rate and regular rhythm.      Heart sounds: No murmur heard.     Pulmonary:      Effort: Pulmonary effort is normal. No respiratory distress.      Breath sounds: " Wheezing and rhonchi present. No rales.   Abdominal:      General: Bowel sounds are normal. There is no distension.      Palpations: Abdomen is soft.   Musculoskeletal:         General: Normal range of motion.      Cervical back: Normal range of motion and neck supple.   Skin:     General: Skin is warm and dry.      Findings: No erythema.   Neurological:      Mental Status: She is alert and oriented to person, place, and time.   Psychiatric:         Behavior: Behavior normal.          Result Review :                         Assessment and Plan    Problem List Items Addressed This Visit        Allergies and Adverse Reactions    Environmental and seasonal allergies       Cardiac and Vasculature    Hypertension       Pulmonary and Pneumonias    Moderate persistent asthma without complication    Relevant Medications    albuterol (PROVENTIL) (2.5 MG/3ML) 0.083% nebulizer solution      Other Visit Diagnoses     SOB (shortness of breath)    -  Primary    Relevant Orders    XR Chest PA & Lateral (Completed)    Bronchitis        Relevant Medications    albuterol (PROVENTIL) (2.5 MG/3ML) 0.083% nebulizer solution          She does seem to be having acute bronchitis or exacerbation of her asthma today.  Go ahead and give her a round of antibiotics and steroids.    She does have a nebulizer machine at home but is out of the medication.  I'll refill her albuterol to use for the next several days to help with her congestion.    Continue on her antihistamine and nasal spray for her nasal congestion.    I did instruct her to get a blood pressure monitor to use at home to monitor blood pressure at home as well as follow-up with her primary care.  We also discussed the importance of taking her medications appropriately.    There seems to be a questionable exposure to a friend last week that now is Covid positive.  She will follow up with urgent treatment center for Covid testing.    Follow Up     No follow-ups on file.  Patient was  given instructions and counseling regarding her condition or for health maintenance advice. Please see specific information pulled into the AVS if appropriate.           ESSIE Eastman, ACNP  [facility]  Electronically signed

## 2021-09-03 RX ORDER — GUAIFENESIN 600 MG/1
600 TABLET, EXTENDED RELEASE ORAL 2 TIMES DAILY
Qty: 60 TABLET | Refills: 1 | Status: SHIPPED | OUTPATIENT
Start: 2021-09-03 | End: 2021-10-15

## 2021-09-03 NOTE — TELEPHONE ENCOUNTER
Refilled Rx Mucinex 600 mg per chart via fax due to pt having still having some congestion w/ sputum/cough. Pt advised to continue with her neb sol as well. Pt verbalized understanding.

## 2021-10-13 ENCOUNTER — CLINICAL SUPPORT NO REQUIREMENTS (OUTPATIENT)
Dept: PULMONOLOGY | Facility: CLINIC | Age: 66
End: 2021-10-13

## 2021-10-13 DIAGNOSIS — Z01.812 BLOOD TESTS PRIOR TO TREATMENT OR PROCEDURE: ICD-10-CM

## 2021-10-13 DIAGNOSIS — Z01.812 BLOOD TESTS PRIOR TO TREATMENT OR PROCEDURE: Primary | ICD-10-CM

## 2021-10-13 PROCEDURE — U0004 COV-19 TEST NON-CDC HGH THRU: HCPCS | Performed by: NURSE PRACTITIONER

## 2021-10-13 PROCEDURE — 99211 OFF/OP EST MAY X REQ PHY/QHP: CPT | Performed by: NURSE PRACTITIONER

## 2021-10-13 PROCEDURE — U0005 INFEC AGEN DETEC AMPLI PROBE: HCPCS | Performed by: NURSE PRACTITIONER

## 2021-10-15 ENCOUNTER — OFFICE VISIT (OUTPATIENT)
Dept: PULMONOLOGY | Facility: CLINIC | Age: 66
End: 2021-10-15

## 2021-10-15 VITALS
HEART RATE: 88 BPM | SYSTOLIC BLOOD PRESSURE: 136 MMHG | DIASTOLIC BLOOD PRESSURE: 84 MMHG | OXYGEN SATURATION: 94 % | WEIGHT: 222.4 LBS | HEIGHT: 64 IN | TEMPERATURE: 98.2 F | BODY MASS INDEX: 37.97 KG/M2

## 2021-10-15 DIAGNOSIS — J30.89 ENVIRONMENTAL AND SEASONAL ALLERGIES: ICD-10-CM

## 2021-10-15 DIAGNOSIS — E66.01 MORBID OBESITY (HCC): ICD-10-CM

## 2021-10-15 DIAGNOSIS — J45.40 MODERATE PERSISTENT ASTHMA WITHOUT COMPLICATION: Primary | ICD-10-CM

## 2021-10-15 DIAGNOSIS — Z86.16 HISTORY OF 2019 NOVEL CORONAVIRUS DISEASE (COVID-19): ICD-10-CM

## 2021-10-15 LAB — SARS-COV-2 RNA NOSE QL NAA+PROBE: NOT DETECTED

## 2021-10-15 PROCEDURE — 94010 BREATHING CAPACITY TEST: CPT | Performed by: NURSE PRACTITIONER

## 2021-10-15 PROCEDURE — 94726 PLETHYSMOGRAPHY LUNG VOLUMES: CPT | Performed by: NURSE PRACTITIONER

## 2021-10-15 PROCEDURE — 94729 DIFFUSING CAPACITY: CPT | Performed by: NURSE PRACTITIONER

## 2021-10-15 PROCEDURE — 99214 OFFICE O/P EST MOD 30 MIN: CPT | Performed by: NURSE PRACTITIONER

## 2021-10-15 RX ORDER — ALBUTEROL SULFATE 90 UG/1
2 AEROSOL, METERED RESPIRATORY (INHALATION) EVERY 4 HOURS PRN
Qty: 18 G | Refills: 5 | Status: SHIPPED | OUTPATIENT
Start: 2021-10-15 | End: 2022-05-13 | Stop reason: SDUPTHER

## 2021-10-15 RX ORDER — BUDESONIDE AND FORMOTEROL FUMARATE DIHYDRATE 160; 4.5 UG/1; UG/1
2 AEROSOL RESPIRATORY (INHALATION)
Qty: 1 EACH | Refills: 5 | Status: SHIPPED | OUTPATIENT
Start: 2021-10-15 | End: 2022-01-27 | Stop reason: ALTCHOICE

## 2021-10-15 RX ORDER — AMLODIPINE BESYLATE 5 MG/1
TABLET ORAL
COMMUNITY

## 2021-10-15 NOTE — PROGRESS NOTES
"[facility] Pulmonary Follow up      Chief Complaint  Asthma    Subjective          Dorene Huizar presents to [facility] for routine follow-up on her chronic bronchitis and persistent asthma symptoms.    She has done fairly well since I last saw her.  She has had some issues following her COVID-19 infection the end of last year.  But overall she is doing much better.    She is currently on Symbicort but only using it once a day.  She does have her albuterol rescue inhaler that she rarely uses.    She has noticed some Increased swelling and cough since she was taken off her fluid pills and started on amlodipine.      Objective     Vital Signs:   /84   Pulse 88   Temp 98.2 °F (36.8 °C)   Ht 162.6 cm (64\")   Wt 101 kg (222 lb 6.4 oz)   SpO2 94% Comment: room air at rest  BMI 38.17 kg/m²       Immunization History   Administered Date(s) Administered   • COVID-19 (MODERNA) 03/31/2021, 04/28/2021   • Fluzone High Dose =>65 Years (Vaxcare ONLY) 10/20/2018, 11/21/2019, 09/02/2020   • Fluzone High-Dose 65+yrs 09/02/2020   • Hep A / Hep B 12/20/2018   • Hep A, Unspecified 11/14/2018   • Hepatitis A 11/14/2018   • Pneumococcal Conjugate 13-Valent (PCV13) 11/20/2018, 11/21/2019   • Pneumococcal Polysaccharide (PPSV23) 08/01/2018, 08/13/2018       Physical Exam  Vitals reviewed.   Constitutional:       Appearance: She is well-developed.   HENT:      Head: Normocephalic and atraumatic.   Eyes:      Pupils: Pupils are equal, round, and reactive to light.   Cardiovascular:      Rate and Rhythm: Normal rate and regular rhythm.      Heart sounds: No murmur heard.      Pulmonary:      Effort: Pulmonary effort is normal. No respiratory distress.      Breath sounds: Wheezing present. No rales.   Abdominal:      General: Bowel sounds are normal. There is no distension.      Palpations: Abdomen is soft.   Musculoskeletal:         General: Normal range of motion.      Cervical " back: Normal range of motion and neck supple.   Skin:     General: Skin is warm and dry.      Findings: No erythema.   Neurological:      Mental Status: She is alert and oriented to person, place, and time.   Psychiatric:         Behavior: Behavior normal.          Result Review :            PFTs done in the office today:  No changes in her chronic restriction with a F VC of 67%, overestimated total capacity secondary to air trapping no significant obstruction.      PA and lateral chest x-ray done the office today reviewed by me  Awaiting final MD interpretation                 Assessment and Plan    Problem List Items Addressed This Visit        Allergies and Adverse Reactions    Environmental and seasonal allergies       Endocrine and Metabolic    Morbid obesity (HCC)       Pulmonary and Pneumonias    Moderate persistent asthma without complication - Primary    Relevant Medications    budesonide-formoterol (SYMBICORT) 160-4.5 MCG/ACT inhaler    albuterol sulfate  (90 Base) MCG/ACT inhaler    Other Relevant Orders    XR Chest PA & Lateral (Completed)    Pulmonary Function Test (Completed)       Other    History of 2019 novel coronavirus disease (COVID-19)    Overview     November 2020             At this time she seems to be doing fairly well from her asthma standpoint.  Her pulmonary function test and chest x-ray are stable.      I have encouraged her to to use her Symbicort twice a day to help with her overall cough and wheezing.  We also discussed using her albuterol HFA as needed with activity and with any worsening shortness of breath or wheezing.  She also has some albuterol nebulizers at home as well.    She is planning to get her Materna booster in the next week or so, I did encourage her to get her flu shot 2 weeks after her booster.    Follow Up     Return in about 6 months (around 4/15/2022).  Patient was given instructions and counseling regarding her condition or for health maintenance advice.  Please see specific information pulled into the AVS if appropriate.     I spent 35 minutes caring for Dorene on this date of service. This time includes time spent by me in the following activities:preparing for the visit, reviewing tests, obtaining and/or reviewing a separately obtained history, performing a medically appropriate examination and/or evaluation , counseling and educating the patient/family/caregiver, ordering medications, tests, or procedures, referring and communicating with other health care professionals , documenting information in the medical record and independently interpreting results and communicating that information with the patient/family/caregiver      ESSIE Eastman, ACNP  [facility]  Electronically signed

## 2021-10-28 DIAGNOSIS — J45.909 ASTHMA, UNSPECIFIED ASTHMA SEVERITY, UNSPECIFIED WHETHER COMPLICATED, UNSPECIFIED WHETHER PERSISTENT: ICD-10-CM

## 2021-10-28 RX ORDER — MONTELUKAST SODIUM 10 MG/1
TABLET ORAL
Qty: 90 TABLET | Refills: 1 | Status: SHIPPED | OUTPATIENT
Start: 2021-10-28

## 2021-10-28 NOTE — TELEPHONE ENCOUNTER
Rx Montelukast 10 refilled per chart via fax per pt's request. Pt notified and verbalized understanding.

## 2022-01-05 ENCOUNTER — TELEPHONE (OUTPATIENT)
Dept: PULMONOLOGY | Facility: CLINIC | Age: 67
End: 2022-01-05

## 2022-01-05 RX ORDER — DOXYCYCLINE HYCLATE 100 MG/1
100 CAPSULE ORAL 2 TIMES DAILY
Qty: 20 CAPSULE | Refills: 0 | Status: SHIPPED | OUTPATIENT
Start: 2022-01-05 | End: 2022-04-14

## 2022-01-05 RX ORDER — PREDNISONE 10 MG/1
TABLET ORAL
Qty: 31 TABLET | Refills: 0 | Status: SHIPPED | OUTPATIENT
Start: 2022-01-05 | End: 2022-04-14

## 2022-01-05 NOTE — TELEPHONE ENCOUNTER
Pt called today c/o cough/congestion/wheezing since 1/1/22. Pt denies any sob/ fever/chills/sore throat and no exposure with anyone that has been dx with COVID-19. Pt is requesting abx/steroids needing to be sent to [facility] on [address]

## 2022-01-06 NOTE — TELEPHONE ENCOUNTER
Patient called the office and stated that she cannot take the antibiotics that were prescribed to her because she has a positive covid test, she wants to know what she needs to do, she would like a return phone call [phone]

## 2022-01-07 NOTE — TELEPHONE ENCOUNTER
Pt notified that abx/steroids has been sent to pharmacy. As far as taking abx/steroids pt has been advised to keep on hand and take if needed along with her neb sol and inhalers. Pt advised if she starts having fever/chest pain/severe sob she will need to go to ED. Pt verbalized understanding and appreciation.

## 2022-01-10 NOTE — TELEPHONE ENCOUNTER
Spoke with pt today and she is requesting a sooner apt sooner apt before 4/13 just for a check up. Pt scheduled for 1/27 w/ xray. Pt verbalized understanding.

## 2022-01-27 ENCOUNTER — OFFICE VISIT (OUTPATIENT)
Dept: PULMONOLOGY | Facility: CLINIC | Age: 67
End: 2022-01-27

## 2022-01-27 VITALS
WEIGHT: 209 LBS | HEART RATE: 80 BPM | DIASTOLIC BLOOD PRESSURE: 74 MMHG | TEMPERATURE: 97.6 F | OXYGEN SATURATION: 99 % | SYSTOLIC BLOOD PRESSURE: 122 MMHG | HEIGHT: 64 IN | BODY MASS INDEX: 35.68 KG/M2

## 2022-01-27 DIAGNOSIS — Z86.16 HISTORY OF 2019 NOVEL CORONAVIRUS DISEASE (COVID-19): ICD-10-CM

## 2022-01-27 DIAGNOSIS — J45.40 MODERATE PERSISTENT ASTHMA WITHOUT COMPLICATION: Primary | ICD-10-CM

## 2022-01-27 DIAGNOSIS — J30.89 ENVIRONMENTAL AND SEASONAL ALLERGIES: ICD-10-CM

## 2022-01-27 PROCEDURE — 99214 OFFICE O/P EST MOD 30 MIN: CPT | Performed by: NURSE PRACTITIONER

## 2022-01-27 NOTE — PROGRESS NOTES
"[facility] [place] Pulmonary Follow up      Chief Complaint  Asthma and Follow-up    Subjective          Dorene Huizar presents to [facility] for follow-up on worsening cough and congestion.  She did call in the office around January 5 with worsening cough and congestion and I called her in a antibiotic and prednisone taper, however she followed up on the sixth and was positive for COVID-19.  She is fully vaccinated and had her last booster in December 2021.  Her only real symptom was the cough and congestion.  She did complete her antibiotics but held the prednisone.  She is back to baseline now.  No significant cough or sputum production.  No significant shortness of breath with activity.        Objective     Vital Signs:   /74   Pulse 80   Temp 97.6 °F (36.4 °C)   Ht 162.6 cm (64\")   Wt 94.8 kg (209 lb)   SpO2 99% Comment: resting, room air  BMI 35.87 kg/m²       Immunization History   Administered Date(s) Administered   • COVID-19 (MODERNA) 1st, 2nd, 3rd Dose Only 03/31/2021, 04/28/2021   • COVID-19 (UNSPECIFIED) 03/31/2021, 04/28/2021, 12/29/2021   • Fluzone High Dose =>65 Years (Vaxcare ONLY) 10/20/2018, 11/21/2019, 09/02/2020   • Fluzone High-Dose 65+yrs 09/02/2020   • Hep A / Hep B 12/20/2018   • Hep A, Unspecified 11/14/2018   • Hepatitis A 11/14/2018   • Influenza, Unspecified 10/11/2021   • Pneumococcal Conjugate 13-Valent (PCV13) 11/20/2018, 11/21/2019   • Pneumococcal Polysaccharide (PPSV23) 08/01/2018, 08/13/2018       Physical Exam  Vitals reviewed.   Constitutional:       General: She is not in acute distress.     Appearance: She is well-developed. She is obese. She is not ill-appearing.   HENT:      Head: Normocephalic and atraumatic.   Eyes:      Pupils: Pupils are equal, round, and reactive to light.   Cardiovascular:      Rate and Rhythm: Normal rate and regular rhythm.      Heart sounds: No murmur heard.      Pulmonary:      Effort: " Pulmonary effort is normal. No respiratory distress.      Breath sounds: Normal breath sounds. No wheezing or rales.   Abdominal:      General: Bowel sounds are normal. There is no distension.      Palpations: Abdomen is soft.   Musculoskeletal:         General: Normal range of motion.      Cervical back: Normal range of motion and neck supple.   Skin:     General: Skin is warm and dry.      Findings: No erythema.   Neurological:      Mental Status: She is alert and oriented to person, place, and time.   Psychiatric:         Behavior: Behavior normal.          Result Review :            PA and lateral chest x-ray done the office today reviewed by me  Awaiting final MD interpretation                 Assessment and Plan    Problem List Items Addressed This Visit        Allergies and Adverse Reactions    Environmental and seasonal allergies       Pulmonary and Pneumonias    Moderate persistent asthma without complication - Primary    Relevant Medications    Fluticasone Furoate-Vilanterol (Breo Ellipta) 100-25 MCG/INH inhaler    Other Relevant Orders    XR Chest PA & Lateral       Other    History of 2019 novel coronavirus disease (COVID-19)    Overview     November 2020 January 2022             She has recovered from her COVID-19.  Unfortunately this is her second round, however November 2020 she was hospitalized and this was mild with a cough.  There is been no changes on her chest x-ray.    She is using her Symbicort, unfortunately her insurance is not covering that so we will see if we can find a better covered option.  Continue with her albuterol as needed.    She will keep her follow-up in April with full PFTs.      Follow Up     No follow-ups on file.  Patient was given instructions and counseling regarding her condition or for health maintenance advice. Please see specific information pulled into the AVS if appropriate.     Moderate level of Medical Decision Making complexity based on 2 or more chronic stable  illnesses and prescription drug management.    Emily Rivero APRN, ACNP  [facility]  Electronically signed

## 2022-04-08 DIAGNOSIS — Z01.812 BLOOD TESTS PRIOR TO TREATMENT OR PROCEDURE: Primary | ICD-10-CM

## 2022-04-12 ENCOUNTER — CLINICAL SUPPORT NO REQUIREMENTS (OUTPATIENT)
Dept: PULMONOLOGY | Facility: CLINIC | Age: 67
End: 2022-04-12

## 2022-04-12 DIAGNOSIS — Z01.812 BLOOD TESTS PRIOR TO TREATMENT OR PROCEDURE: ICD-10-CM

## 2022-04-12 PROCEDURE — 99211 OFF/OP EST MAY X REQ PHY/QHP: CPT | Performed by: NURSE PRACTITIONER

## 2022-04-12 PROCEDURE — U0004 COV-19 TEST NON-CDC HGH THRU: HCPCS | Performed by: NURSE PRACTITIONER

## 2022-04-12 PROCEDURE — U0005 INFEC AGEN DETEC AMPLI PROBE: HCPCS | Performed by: NURSE PRACTITIONER

## 2022-04-13 LAB — SARS-COV-2 RNA NOSE QL NAA+PROBE: NOT DETECTED

## 2022-04-14 ENCOUNTER — OFFICE VISIT (OUTPATIENT)
Dept: PULMONOLOGY | Facility: CLINIC | Age: 67
End: 2022-04-14

## 2022-04-14 VITALS
RESPIRATION RATE: 16 BRPM | HEART RATE: 80 BPM | DIASTOLIC BLOOD PRESSURE: 72 MMHG | OXYGEN SATURATION: 99 % | TEMPERATURE: 97.5 F | WEIGHT: 212 LBS | HEIGHT: 64 IN | SYSTOLIC BLOOD PRESSURE: 130 MMHG | BODY MASS INDEX: 36.19 KG/M2

## 2022-04-14 DIAGNOSIS — J45.40 MODERATE PERSISTENT ASTHMA WITHOUT COMPLICATION: Primary | ICD-10-CM

## 2022-04-14 DIAGNOSIS — R06.83 SNORING: ICD-10-CM

## 2022-04-14 DIAGNOSIS — Z86.16 HISTORY OF 2019 NOVEL CORONAVIRUS DISEASE (COVID-19): ICD-10-CM

## 2022-04-14 PROCEDURE — 99214 OFFICE O/P EST MOD 30 MIN: CPT | Performed by: NURSE PRACTITIONER

## 2022-04-14 PROCEDURE — 94726 PLETHYSMOGRAPHY LUNG VOLUMES: CPT | Performed by: NURSE PRACTITIONER

## 2022-04-14 PROCEDURE — 94729 DIFFUSING CAPACITY: CPT | Performed by: NURSE PRACTITIONER

## 2022-04-14 PROCEDURE — 94060 EVALUATION OF WHEEZING: CPT | Performed by: NURSE PRACTITIONER

## 2022-04-14 RX ORDER — POTASSIUM CHLORIDE 20 MEQ/1
20 TABLET, EXTENDED RELEASE ORAL DAILY
COMMUNITY
Start: 2022-01-28

## 2022-04-14 RX ORDER — POLYETHYLENE GLYCOL 3350, SODIUM SULFATE ANHYDROUS, SODIUM BICARBONATE, SODIUM CHLORIDE, POTASSIUM CHLORIDE 236; 22.74; 6.74; 5.86; 2.97 G/4L; G/4L; G/4L; G/4L; G/4L
POWDER, FOR SOLUTION ORAL SEE ADMIN INSTRUCTIONS
COMMUNITY
Start: 2022-02-04

## 2022-04-14 RX ORDER — GLUCOSAM/CHON-MSM1/C/MANG/BOSW 500-416.6
TABLET ORAL
COMMUNITY
Start: 2022-01-26

## 2022-04-14 RX ORDER — ALBUTEROL SULFATE 90 UG/1
4 AEROSOL, METERED RESPIRATORY (INHALATION) ONCE
Status: COMPLETED | OUTPATIENT
Start: 2022-04-14 | End: 2022-04-14

## 2022-04-14 RX ADMIN — ALBUTEROL SULFATE 4 PUFF: 90 AEROSOL, METERED RESPIRATORY (INHALATION) at 08:33

## 2022-04-14 NOTE — PROGRESS NOTES
"[facility] Pulmonary Follow up      Chief Complaint  Follow-up    Subjective          Dorene Huizar presents to [facility] for follow-up on her moderate persistent asthma.    She does continue to use her Breo daily.  She has been having some sinus congestion due to seasonal allergies.  She has been using a nasal rinse as well as some Flonase nasal spray.  She has not had a recent acute exacerbation or illness.  She does feel like her breathing is doing pretty well right now.        Objective     Vital Signs:   /72   Pulse 80   Temp 97.5 °F (36.4 °C) (Infrared)   Resp 16   Ht 162.6 cm (64\")   Wt 96.2 kg (212 lb)   SpO2 99%   BMI 36.39 kg/m²       Immunization History   Administered Date(s) Administered   • COVID-19 (MODERNA) 1st, 2nd, 3rd Dose Only 03/31/2021, 04/28/2021   • COVID-19 (UNSPECIFIED) 03/31/2021, 04/28/2021, 12/29/2021   • Fluzone High Dose =>65 Years (Vaxcare ONLY) 10/20/2018, 11/21/2019, 09/02/2020   • Fluzone High-Dose 65+yrs 09/02/2020   • Hep A / Hep B 12/20/2018   • Hep A, Unspecified 11/14/2018   • Hepatitis A 11/14/2018   • Influenza, Unspecified 10/11/2021   • Pneumococcal Conjugate 13-Valent (PCV13) 11/20/2018, 11/21/2019   • Pneumococcal Polysaccharide (PPSV23) 08/01/2018, 08/13/2018       Physical Exam  Vitals reviewed.   Constitutional:       General: She is not in acute distress.     Appearance: She is well-developed. She is obese. She is not ill-appearing.   HENT:      Head: Normocephalic and atraumatic.   Eyes:      Pupils: Pupils are equal, round, and reactive to light.   Cardiovascular:      Rate and Rhythm: Normal rate and regular rhythm.      Heart sounds: No murmur heard.  Pulmonary:      Effort: Pulmonary effort is normal. No respiratory distress.      Breath sounds: No wheezing or rales.      Comments: Decreased but clear, no wheezing  Abdominal:      General: Bowel sounds are normal. There is no distension.      " Palpations: Abdomen is soft.   Musculoskeletal:         General: Normal range of motion.      Cervical back: Normal range of motion and neck supple.   Skin:     General: Skin is warm and dry.      Findings: No erythema.   Neurological:      Mental Status: She is alert and oriented to person, place, and time.   Psychiatric:         Behavior: Behavior normal.          Result Review :            PFTs done in the office today:  No changes in her restrictive disease with FVC of 1.51, 60% predicted and a total capacity of 3.0 L, 63%                   Assessment and Plan    Problem List Items Addressed This Visit        Pulmonary and Pneumonias    Moderate persistent asthma without complication - Primary    Relevant Medications    albuterol sulfate HFA (PROVENTIL HFA;VENTOLIN HFA;PROAIR HFA) inhaler 4 puff (Completed)    Other Relevant Orders    Pulmonary Function Test (Completed)       Sleep    Snoring    Overview     History of obstructive sleep apnea which was treated around 2010, she stopped wearing PAP and her most recent study 10/27/16 revealed no DALILA, on room air no desaturations              Other    History of 2019 novel coronavirus disease (COVID-19)    Overview     November 2020 January 2022               At this time she is doing fairly well.  Her tests have not changed over time.  She has had some issues with exacerbations as well as having COVID-19 in the last year.  However at this time she is on her medications of Breo daily and treatment for her chronic sinusitis.    Follow-up here in the office in 6 months or as needed.      Follow Up     No follow-ups on file.  Patient was given instructions and counseling regarding her condition or for health maintenance advice. Please see specific information pulled into the AVS if appropriate.     I spent 35 minutes caring for Droene on this date of service. This time includes time spent by me in the following activities:preparing for the visit, reviewing tests,  obtaining and/or reviewing a separately obtained history, performing a medically appropriate examination and/or evaluation , counseling and educating the patient/family/caregiver, ordering medications, tests, or procedures, referring and communicating with other health care professionals , documenting information in the medical record and independently interpreting results and communicating that information with the patient/family/caregiver    Moderate level of Medical Decision Making complexity based on 2 or more chronic stable illnesses and prescription drug management.    Emily Rivero APRN, ACNP  [facility]  Electronically signed

## 2022-05-13 ENCOUNTER — OFFICE VISIT (OUTPATIENT)
Dept: PULMONOLOGY | Facility: CLINIC | Age: 67
End: 2022-05-13

## 2022-05-13 VITALS
WEIGHT: 214.8 LBS | HEART RATE: 102 BPM | OXYGEN SATURATION: 94 % | SYSTOLIC BLOOD PRESSURE: 144 MMHG | HEIGHT: 64 IN | TEMPERATURE: 97.3 F | DIASTOLIC BLOOD PRESSURE: 78 MMHG | BODY MASS INDEX: 36.67 KG/M2

## 2022-05-13 DIAGNOSIS — J45.40 MODERATE PERSISTENT ASTHMA WITHOUT COMPLICATION: Primary | ICD-10-CM

## 2022-05-13 DIAGNOSIS — J30.89 ENVIRONMENTAL AND SEASONAL ALLERGIES: ICD-10-CM

## 2022-05-13 PROCEDURE — 99214 OFFICE O/P EST MOD 30 MIN: CPT | Performed by: NURSE PRACTITIONER

## 2022-05-13 RX ORDER — PREDNISONE 20 MG/1
2 TABLET ORAL DAILY
COMMUNITY
Start: 2022-05-12 | End: 2022-05-26

## 2022-05-13 RX ORDER — ALBUTEROL SULFATE 90 UG/1
2 AEROSOL, METERED RESPIRATORY (INHALATION) EVERY 4 HOURS PRN
Qty: 18 G | Refills: 5 | Status: SHIPPED | OUTPATIENT
Start: 2022-05-13

## 2022-05-13 RX ORDER — DOXYCYCLINE 100 MG/1
100 TABLET ORAL 2 TIMES DAILY
COMMUNITY
Start: 2022-05-12 | End: 2022-05-19

## 2022-05-13 NOTE — PROGRESS NOTES
"[facility] Pulmonary Follow up      Chief Complaint  Moderate persistent asthma without complication (F/U)    Subjective          Dorene Huizar presents to [facility] for follow-up after her ER visit last night.  She went to the  ER yesterday with worsening shortness of some wheezing.  Her chest x-ray did show some patchy bilateral infiltrates.  She was placed on a round of antibiotics and steroids.    She does have seasonal allergies and does usually get a little bronchitis episodes with season changes.    She has had no fevers or chills.  No productive cough.  No nausea vomiting or diarrhea.      Objective     Vital Signs:   /78 (BP Location: Right arm, Patient Position: Sitting, Cuff Size: Adult)   Pulse 102   Temp 97.3 °F (36.3 °C) (Infrared)   Ht 162.6 cm (64\")   Wt 97.4 kg (214 lb 12.8 oz)   SpO2 94% Comment: resting, room air  BMI 36.87 kg/m²       Immunization History   Administered Date(s) Administered   • COVID-19 (MODERNA) 1st, 2nd, 3rd Dose Only 03/31/2021, 04/28/2021, 12/29/2021   • COVID-19 (UNSPECIFIED) 03/31/2021, 04/28/2021, 12/29/2021   • Fluad Quad 65+ 11/01/2021   • Fluzone High Dose =>65 Years (Vaxcare ONLY) 10/20/2018, 11/21/2019, 09/02/2020   • Fluzone High-Dose 65+yrs 09/02/2020   • Hep A / Hep B 12/20/2018   • Hep A, Unspecified 11/14/2018   • Hepatitis A 11/14/2018   • Influenza, Unspecified 10/11/2021   • Pneumococcal Conjugate 13-Valent (PCV13) 11/20/2018, 11/21/2019   • Pneumococcal Polysaccharide (PPSV23) 08/01/2018, 08/13/2018       Physical Exam  Vitals reviewed.   Constitutional:       General: She is not in acute distress.     Appearance: She is well-developed. She is obese.   HENT:      Head: Normocephalic and atraumatic.   Eyes:      Pupils: Pupils are equal, round, and reactive to light.   Cardiovascular:      Rate and Rhythm: Normal rate and regular rhythm.      Heart sounds: No murmur heard.  Pulmonary:      " Effort: Pulmonary effort is normal. No respiratory distress.      Breath sounds: Wheezing and rhonchi present. No rales.   Abdominal:      General: Bowel sounds are normal. There is no distension.      Palpations: Abdomen is soft.   Musculoskeletal:         General: Normal range of motion.      Cervical back: Normal range of motion and neck supple.   Skin:     General: Skin is warm and dry.      Findings: No erythema.   Neurological:      Mental Status: She is alert and oriented to person, place, and time.   Psychiatric:         Behavior: Behavior normal.          Result Review :       Data reviewed: [facility] ER visit notes and x-ray                  Assessment and Plan    Problem List Items Addressed This Visit        Allergies and Adverse Reactions    Environmental and seasonal allergies       Pulmonary and Pneumonias    Moderate persistent asthma without complication - Primary    Relevant Medications    albuterol sulfate  (90 Base) MCG/ACT inhaler        At this time she will complete her antibiotics and steroids from the  emergency department.    She is also going to resume her nebulizers at home to help with secretion clearance.    She is on her antihistamine as well as montelukast and Mucinex.    She will keep her follow-up in October, but call sooner if she does not improve by the next 10 to 14 days.    Follow Up     Return if symptoms worsen or fail to improve.  Patient was given instructions and counseling regarding her condition or for health maintenance advice. Please see specific information pulled into the AVS if appropriate.     I spent 35 minutes caring for Dorene on this date of service. This time includes time spent by me in the following activities:preparing for the visit, reviewing tests, obtaining and/or reviewing a separately obtained history, performing a medically appropriate examination and/or evaluation , counseling and educating the patient/family/caregiver, ordering  medications, tests, or procedures, referring and communicating with other health care professionals , documenting information in the medical record and independently interpreting results and communicating that information with the patient/family/caregiver    Moderate level of Medical Decision Making complexity based on 2 or more chronic stable illnesses and prescription drug management.    Emily Rivero APRN, ACNP  [facility]  Electronically signed

## 2022-05-25 ENCOUNTER — TELEPHONE (OUTPATIENT)
Dept: PULMONOLOGY | Facility: CLINIC | Age: 67
End: 2022-05-25

## 2022-05-25 NOTE — TELEPHONE ENCOUNTER
Pt called today c/o cough/wheezing for 3-4 days. Pt denies fever/chest pain. Pt is requesting abx/steroids needing to be sent to [facility].

## 2022-05-26 RX ORDER — AMOXICILLIN AND CLAVULANATE POTASSIUM 875; 125 MG/1; MG/1
1 TABLET, FILM COATED ORAL 2 TIMES DAILY
Qty: 20 TABLET | Refills: 0 | Status: SHIPPED | OUTPATIENT
Start: 2022-05-26 | End: 2022-10-17

## 2022-05-26 RX ORDER — PREDNISONE 10 MG/1
TABLET ORAL
Qty: 31 TABLET | Refills: 0 | Status: SHIPPED | OUTPATIENT
Start: 2022-05-26 | End: 2022-10-17

## 2022-05-26 NOTE — TELEPHONE ENCOUNTER
Pt notified that abx/steroids has been sent to pharmacy and will cancel her apt for today. Pt has been informed if she is not feeling any better by Monday she will need to contact the office and schedule an apt. Pt verbalized understanding.

## 2022-06-03 ENCOUNTER — TELEPHONE (OUTPATIENT)
Dept: PULMONOLOGY | Facility: CLINIC | Age: 67
End: 2022-06-03

## 2022-06-03 NOTE — TELEPHONE ENCOUNTER
I do not have any availability to follow her up today, if she continues to feel ill over the weekend she can urgent treatment center or we can get her in on Monday morning.

## 2022-06-03 NOTE — TELEPHONE ENCOUNTER
Pt called today requesting a call back @ [phone] due to still having some wheezing/cough w/ sputum. Pt denies fever/chills/nausea.

## 2022-06-03 NOTE — TELEPHONE ENCOUNTER
Spoke with pt and she stated that she was about to start taking the abx/steroids that was sent to pharmacy on 5/26 because she never picked them up due to insurance. Pt advised to start taking abx/steroids as prescribed and if no better she will contact the office and schedule an apt.

## 2022-10-17 ENCOUNTER — OFFICE VISIT (OUTPATIENT)
Dept: PULMONOLOGY | Facility: CLINIC | Age: 67
End: 2022-10-17

## 2022-10-17 VITALS
SYSTOLIC BLOOD PRESSURE: 122 MMHG | TEMPERATURE: 97.5 F | BODY MASS INDEX: 37.56 KG/M2 | DIASTOLIC BLOOD PRESSURE: 80 MMHG | HEART RATE: 84 BPM | OXYGEN SATURATION: 96 % | HEIGHT: 64 IN | WEIGHT: 220 LBS

## 2022-10-17 DIAGNOSIS — J45.40 MODERATE PERSISTENT ASTHMA WITHOUT COMPLICATION: Primary | ICD-10-CM

## 2022-10-17 DIAGNOSIS — Z86.16 HISTORY OF 2019 NOVEL CORONAVIRUS DISEASE (COVID-19): ICD-10-CM

## 2022-10-17 DIAGNOSIS — J30.89 ENVIRONMENTAL AND SEASONAL ALLERGIES: ICD-10-CM

## 2022-10-17 PROCEDURE — 99214 OFFICE O/P EST MOD 30 MIN: CPT | Performed by: NURSE PRACTITIONER

## 2022-10-17 NOTE — PROGRESS NOTES
"[facility] [place] [facility]      Chief Complaint  Asthma and Follow-up    Subjective          Dorene Huizar presents to [facility] for routine follow-up.  I follow her here in the office for moderate persistent asthma.  She said she been doing very well lately.  She has had no ER visits or acute exacerbations.  She continues on her Breo daily as well as her Singulair and Xyzal.  She does have chronic nasal congestion from seasonal allergies.  She uses saline rinses as needed.        Objective     Vital Signs:   /80   Pulse 84   Temp 97.5 °F (36.4 °C)   Ht 162.6 cm (64\")   Wt 99.8 kg (220 lb)   SpO2 96% Comment: resting, room air  BMI 37.76 kg/m²       Immunization History   Administered Date(s) Administered   • COVID-19 (MODERNA) 1st, 2nd, 3rd Dose Only 03/31/2021, 04/28/2021, 12/29/2021   • COVID-19 (UNSPECIFIED) 03/31/2021, 04/28/2021, 12/29/2021   • Fluad Quad 65+ 11/01/2021   • Fluzone High Dose =>65 Years (Vaxcare ONLY) 10/20/2018, 11/21/2019, 09/02/2020   • Fluzone High-Dose 65+yrs 09/02/2020   • Hep A / Hep B 12/20/2018   • Hep A, Unspecified 11/14/2018   • Hepatitis A 11/14/2018   • Influenza, Unspecified 10/11/2021   • Pneumococcal Conjugate 13-Valent (PCV13) 11/20/2018, 11/21/2019   • Pneumococcal Polysaccharide (PPSV23) 08/01/2018, 08/13/2018       Physical Exam  Vitals reviewed.   Constitutional:       Appearance: She is well-developed.   HENT:      Head: Normocephalic and atraumatic.   Eyes:      Pupils: Pupils are equal, round, and reactive to light.   Cardiovascular:      Rate and Rhythm: Normal rate and regular rhythm.      Heart sounds: No murmur heard.  Pulmonary:      Effort: Pulmonary effort is normal. No respiratory distress.      Breath sounds: Normal breath sounds. No wheezing or rales.   Abdominal:      General: Bowel sounds are normal. There is no distension.      Palpations: Abdomen is soft.   Musculoskeletal:         " General: Normal range of motion.      Cervical back: Normal range of motion and neck supple.   Skin:     General: Skin is warm and dry.      Findings: No erythema.   Neurological:      Mental Status: She is alert and oriented to person, place, and time.   Psychiatric:         Behavior: Behavior normal.          Result Review :                     Assessment and Plan    Problem List Items Addressed This Visit        Allergies and Adverse Reactions    Environmental and seasonal allergies       Pulmonary and Pneumonias    Moderate persistent asthma without complication - Primary       Other    History of 2019 novel coronavirus disease (COVID-19)    Overview     November 2020 January 2022            At this time she is doing very well.  She is on her Breo, and rarely using her albuterol.  She has had no recent exacerbation or emergency department visit.  She is up-to-date on her vaccines.    She will follow-up in 6 months with full PFTs and a chest x-ray.  She also follow-up as needed over the winter with any acute exacerbations.    Follow Up     No follow-ups on file.  Patient was given instructions and counseling regarding her condition or for health maintenance advice. Please see specific information pulled into the AVS if appropriate.     I spent 25 minutes caring for Dorene on this date of service. This time includes time spent by me in the following activities:preparing for the visit, reviewing tests, obtaining and/or reviewing a separately obtained history, performing a medically appropriate examination and/or evaluation , counseling and educating the patient/family/caregiver and documenting information in the medical record        ESSIE Eastman, ACNP  [facility]  Electronically signed

## 2022-11-09 ENCOUNTER — TELEPHONE (OUTPATIENT)
Dept: PULMONOLOGY | Facility: CLINIC | Age: 67
End: 2022-11-09

## 2022-11-09 NOTE — TELEPHONE ENCOUNTER
Pt called today requesting a letter needing to be sent to  [email or web address] for using oxygen. Pt will be stopping by the office tomorrow to  samples of Breo and states she can  letter as well if this is approved.

## 2023-01-03 ENCOUNTER — TELEPHONE (OUTPATIENT)
Dept: PULMONOLOGY | Facility: CLINIC | Age: 68
End: 2023-01-03
Payer: MEDICARE

## 2023-01-03 NOTE — TELEPHONE ENCOUNTER
Pt called c/o cough and wheezing, cough has been persistent for 3 days, pt wants to come in or have something called in. Please advise

## 2023-01-04 RX ORDER — PREDNISONE 10 MG/1
TABLET ORAL
Qty: 31 TABLET | Refills: 0 | Status: SHIPPED | OUTPATIENT
Start: 2023-01-04 | End: 2023-02-06

## 2023-01-04 RX ORDER — AZITHROMYCIN 250 MG/1
TABLET, FILM COATED ORAL
Qty: 6 TABLET | Refills: 0 | Status: SHIPPED | OUTPATIENT
Start: 2023-01-04

## 2023-01-05 ENCOUNTER — TELEPHONE (OUTPATIENT)
Dept: PULMONOLOGY | Facility: CLINIC | Age: 68
End: 2023-01-05
Payer: MEDICARE

## 2023-01-09 ENCOUNTER — OFFICE VISIT (OUTPATIENT)
Dept: PULMONOLOGY | Facility: CLINIC | Age: 68
End: 2023-01-09
Payer: MEDICARE

## 2023-01-09 VITALS
DIASTOLIC BLOOD PRESSURE: 80 MMHG | OXYGEN SATURATION: 97 % | HEART RATE: 92 BPM | BODY MASS INDEX: 36.67 KG/M2 | HEIGHT: 64 IN | SYSTOLIC BLOOD PRESSURE: 140 MMHG | WEIGHT: 214.8 LBS | TEMPERATURE: 97.7 F

## 2023-01-09 DIAGNOSIS — H66.91 RIGHT OTITIS MEDIA, UNSPECIFIED OTITIS MEDIA TYPE: ICD-10-CM

## 2023-01-09 DIAGNOSIS — J30.89 ENVIRONMENTAL AND SEASONAL ALLERGIES: ICD-10-CM

## 2023-01-09 DIAGNOSIS — J45.40 MODERATE PERSISTENT ASTHMA WITHOUT COMPLICATION: Primary | ICD-10-CM

## 2023-01-09 PROCEDURE — 99214 OFFICE O/P EST MOD 30 MIN: CPT | Performed by: NURSE PRACTITIONER

## 2023-01-09 RX ORDER — FAMOTIDINE 40 MG/1
TABLET, FILM COATED ORAL EVERY 12 HOURS SCHEDULED
COMMUNITY
End: 2023-01-09

## 2023-01-09 RX ORDER — NEOMYCIN SULFATE, POLYMYXIN B SULFATE AND HYDROCORTISONE 10; 3.5; 1 MG/ML; MG/ML; [USP'U]/ML
3 SUSPENSION/ DROPS AURICULAR (OTIC) 4 TIMES DAILY
Qty: 10 ML | Refills: 0 | Status: SHIPPED | OUTPATIENT
Start: 2023-01-09

## 2023-01-09 RX ORDER — AMMONIUM LACTATE 12 G/100G
CREAM TOPICAL
COMMUNITY
Start: 2022-10-26

## 2023-01-09 RX ORDER — GUAIFENESIN 600 MG/1
1200 TABLET, EXTENDED RELEASE ORAL 2 TIMES DAILY
Qty: 120 TABLET | Refills: 0 | Status: SHIPPED | OUTPATIENT
Start: 2023-01-09

## 2023-01-09 RX ORDER — OMEPRAZOLE 40 MG/1
CAPSULE, DELAYED RELEASE ORAL
COMMUNITY

## 2023-01-09 NOTE — PROGRESS NOTES
[facility] Pulmonary Follow up      Chief Complaint  Moderate persistent asthma without complication (FOLLOW UP)    Subjective          Dorene Huizar presents to [facility] for worsening cough and congestion.  She has been having quite a bit of sinus drainage and pressure recently.  She does have a cough and congestion but she is mostly white.  She had no fevers.  She does complain of wheezing and chest tightness.    When she called to make a call her in a round of antibiotics and steroids but she did not realize that and never picked them up.  She is feeling a bit better than last week.  She feels like the congestion has improved some.  She does continue to complain of right ear pain and sinus pressure.    She does continue on her nebulizers to help with secretion clearance as well as her Breo daily.    Objective     Vital Signs:   /80   Pulse 92   Temp 97.7 °F (36.5 °C) (Infrared)   Ht 162.6 cm (64\")   Wt 97.4 kg (214 lb 12.8 oz)   SpO2 97% Comment: RESTING ROOM AIR  BMI 36.87 kg/m²       Immunization History   Administered Date(s) Administered   • COVID-19 (MODERNA) 1st, 2nd, 3rd Dose Only 03/31/2021, 04/28/2021, 12/29/2021   • COVID-19 (UNSPECIFIED) 03/31/2021, 04/28/2021, 12/29/2021   • Fluad Quad 65+ 11/01/2021   • Fluzone High Dose =>65 Years (Vaxcare ONLY) 10/20/2018, 11/21/2019, 09/02/2020   • Fluzone High-Dose 65+yrs 09/02/2020, 09/12/2022   • Hep A / Hep B 12/20/2018   • Hep A, Unspecified 11/14/2018   • Hepatitis A 11/14/2018   • Influenza, Unspecified 10/11/2021   • Pneumococcal Conjugate 13-Valent (PCV13) 11/20/2018, 11/21/2019   • Pneumococcal Polysaccharide (PPSV23) 08/01/2018, 08/13/2018       Physical Exam  Vitals reviewed.   Constitutional:       General: She is not in acute distress.     Appearance: She is well-developed. She is obese.   HENT:      Head: Normocephalic and atraumatic.      Right Ear: Swelling present. Tympanic  membrane is erythematous.      Left Ear: Tympanic membrane normal.   Eyes:      Pupils: Pupils are equal, round, and reactive to light.   Cardiovascular:      Rate and Rhythm: Normal rate and regular rhythm.      Heart sounds: No murmur heard.  Pulmonary:      Effort: Pulmonary effort is normal. No respiratory distress.      Breath sounds: Normal breath sounds. No wheezing or rales.   Abdominal:      General: Bowel sounds are normal. There is no distension.      Palpations: Abdomen is soft.   Musculoskeletal:         General: Normal range of motion.      Cervical back: Normal range of motion and neck supple.   Skin:     General: Skin is warm and dry.      Findings: No erythema.   Neurological:      Mental Status: She is alert and oriented to person, place, and time.   Psychiatric:         Behavior: Behavior normal.          Result Review :                           Assessment and Plan    Problem List Items Addressed This Visit        Allergies and Adverse Reactions    Environmental and seasonal allergies       Pulmonary and Pneumonias    Moderate persistent asthma without complication - Primary   Other Visit Diagnoses     Right otitis media, unspecified otitis media type            Think she is having an acute infection at this time since she seems to be improving.  She could go ahead and  the prednisone and antibiotic to have on hand in case it worsens.    She is clear today without significant wheezing, no signs of an asthma exacerbation.    She does seem to have a right otitis media.  I will call her in some eardrops.    I encouraged her to use her Mucinex as well as her nebulizers twice daily to help with secretion clearance.      Follow Up     No follow-ups on file.  Patient was given instructions and counseling regarding her condition or for health maintenance advice. Please see specific information pulled into the AVS if appropriate.         Moderate level of Medical Decision Making complexity based on  2 or more chronic stable illnesses and prescription drug management.    ESSIE Eastman, ACNP  [facility] Pulmonary Critical Care Medicine  Electronically signed

## 2023-02-06 ENCOUNTER — TELEPHONE (OUTPATIENT)
Dept: PULMONOLOGY | Facility: CLINIC | Age: 68
End: 2023-02-06
Payer: MEDICARE

## 2023-02-06 RX ORDER — PREDNISONE 10 MG/1
TABLET ORAL
Qty: 31 TABLET | Refills: 0 | Status: SHIPPED | OUTPATIENT
Start: 2023-02-06

## 2023-02-06 RX ORDER — AMOXICILLIN AND CLAVULANATE POTASSIUM 875; 125 MG/1; MG/1
1 TABLET, FILM COATED ORAL 2 TIMES DAILY
Qty: 20 TABLET | Refills: 0 | Status: SHIPPED | OUTPATIENT
Start: 2023-02-06

## 2023-02-06 NOTE — TELEPHONE ENCOUNTER
Patient called complaining of increased congestion in chest for over a week. States mucinex doesn't seem to be helping much. Denies fever or any other symptoms. Wants to know if abx and steroids could be called in or does she need to make an appt.

## 2023-02-21 RX ORDER — FLUTICASONE FUROATE AND VILANTEROL 100; 25 UG/1; UG/1
1 POWDER RESPIRATORY (INHALATION)
Qty: 1 EACH | Refills: 5 | Status: SHIPPED | OUTPATIENT
Start: 2023-02-21 | End: 2023-02-22

## 2023-02-22 ENCOUNTER — DOCUMENTATION (OUTPATIENT)
Dept: PULMONOLOGY | Facility: CLINIC | Age: 68
End: 2023-02-22
Payer: MEDICARE

## 2023-02-22 DIAGNOSIS — J45.40 MODERATE PERSISTENT ASTHMA WITHOUT COMPLICATION: ICD-10-CM

## 2023-02-22 RX ORDER — FLUTICASONE FUROATE AND VILANTEROL TRIFENATATE 100; 25 UG/1; UG/1
1 POWDER RESPIRATORY (INHALATION)
Qty: 1 EACH | Refills: 5 | Status: SHIPPED | OUTPATIENT
Start: 2023-02-22

## 2023-02-22 NOTE — TELEPHONE ENCOUNTER
Spoke to pt, she requested the brand name Breo be sent in instead of generic, pt stated insurance covers for $4. Resending as TIMUR.

## 2023-03-14 ENCOUNTER — TELEPHONE (OUTPATIENT)
Dept: PULMONOLOGY | Facility: CLINIC | Age: 68
End: 2023-03-14
Payer: MEDICARE

## 2023-03-14 NOTE — TELEPHONE ENCOUNTER
Pt called she is getting over covid from February. Pt c/o increased mucus congestion. She states she's been taking the mucinex as well as using the neb soln and is not getting the mucus up. She states her nasal spray helps some but

## 2023-04-17 ENCOUNTER — HOSPITAL ENCOUNTER (OUTPATIENT)
Dept: GENERAL RADIOLOGY | Facility: HOSPITAL | Age: 68
Discharge: HOME | End: 2023-04-17
Admitting: NURSE PRACTITIONER
Payer: MEDICARE

## 2023-04-17 ENCOUNTER — OFFICE VISIT (OUTPATIENT)
Dept: PULMONOLOGY | Facility: CLINIC | Age: 68
End: 2023-04-17
Payer: MEDICARE

## 2023-04-17 VITALS
HEIGHT: 64 IN | BODY MASS INDEX: 36.29 KG/M2 | WEIGHT: 212.6 LBS | OXYGEN SATURATION: 99 % | SYSTOLIC BLOOD PRESSURE: 142 MMHG | TEMPERATURE: 97.8 F | HEART RATE: 91 BPM | DIASTOLIC BLOOD PRESSURE: 70 MMHG

## 2023-04-17 DIAGNOSIS — J45.40 MODERATE PERSISTENT ASTHMA WITHOUT COMPLICATION: ICD-10-CM

## 2023-04-17 DIAGNOSIS — J45.909 ASTHMA, UNSPECIFIED ASTHMA SEVERITY, UNSPECIFIED WHETHER COMPLICATED, UNSPECIFIED WHETHER PERSISTENT: ICD-10-CM

## 2023-04-17 DIAGNOSIS — J45.40 MODERATE PERSISTENT ASTHMA WITHOUT COMPLICATION: Primary | ICD-10-CM

## 2023-04-17 PROCEDURE — 94726 PLETHYSMOGRAPHY LUNG VOLUMES: CPT | Performed by: NURSE PRACTITIONER

## 2023-04-17 PROCEDURE — 94729 DIFFUSING CAPACITY: CPT | Performed by: NURSE PRACTITIONER

## 2023-04-17 PROCEDURE — 94375 RESPIRATORY FLOW VOLUME LOOP: CPT | Performed by: NURSE PRACTITIONER

## 2023-04-17 PROCEDURE — 3077F SYST BP >= 140 MM HG: CPT | Performed by: NURSE PRACTITIONER

## 2023-04-17 PROCEDURE — 99214 OFFICE O/P EST MOD 30 MIN: CPT | Performed by: NURSE PRACTITIONER

## 2023-04-17 PROCEDURE — 3078F DIAST BP <80 MM HG: CPT | Performed by: NURSE PRACTITIONER

## 2023-04-17 PROCEDURE — 71046 X-RAY EXAM CHEST 2 VIEWS: CPT

## 2023-04-17 RX ORDER — MONTELUKAST SODIUM 10 MG/1
TABLET ORAL
Qty: 90 TABLET | Refills: 3 | Status: SHIPPED | OUTPATIENT
Start: 2023-04-17

## 2023-04-17 NOTE — PROGRESS NOTES
"[facility] Pulmonary Follow up      Chief Complaint  Asthma (F/u)    Subjective          Dorene Huizar presents to [facility] for follow-up on her moderate persistent asthma.  Currently she is on Breo daily.  She said no recent acute exacerbations or illnesses.  She is not even been using her rescue inhaler recently.  She does have issues with seasonal allergies and is on an antihistamine.  Does not look like she is taking her montelukast lately.    She did have COVID-19 in February and does feel like she recovered.  She had a chest x-ray during her acute episode as noted below.    She feels like her shortness of breath with activity is the same.  She denies any worsening dyspnea.      Objective     Vital Signs:   /70 (BP Location: Left arm, Patient Position: Sitting, Cuff Size: Adult)   Pulse 91   Temp 97.8 °F (36.6 °C) (Infrared)   Ht 162.6 cm (64.02\")   Wt 96.4 kg (212 lb 9.6 oz)   SpO2 99% Comment: resting room air  BMI 36.47 kg/m²       Immunization History   Administered Date(s) Administered   • COVID-19 (MODERNA) 1st, 2nd, 3rd Dose Only 03/31/2021, 04/28/2021, 12/29/2021   • COVID-19 (UNSPECIFIED) 03/31/2021, 04/28/2021, 12/29/2021   • Fluad Quad 65+ 11/01/2021   • Fluzone High Dose =>65 Years (Vaxcare ONLY) 10/20/2018, 11/21/2019, 09/02/2020   • Fluzone High-Dose 65+yrs 09/02/2020, 09/12/2022   • Hep A / Hep B 12/20/2018   • Hep A, Unspecified 11/14/2018   • Hepatitis A 11/14/2018   • Influenza, Unspecified 10/11/2021   • Pneumococcal Conjugate 13-Valent (PCV13) 11/20/2018, 11/21/2019   • Pneumococcal Polysaccharide (PPSV23) 08/01/2018, 08/13/2018       Physical Exam  Vitals reviewed.   Constitutional:       Appearance: She is well-developed.   HENT:      Head: Normocephalic and atraumatic.   Eyes:      Pupils: Pupils are equal, round, and reactive to light.   Cardiovascular:      Rate and Rhythm: Normal rate and regular rhythm.      Heart " sounds: No murmur heard.  Pulmonary:      Effort: Pulmonary effort is normal. No respiratory distress.      Breath sounds: Normal breath sounds. No wheezing or rales.   Abdominal:      General: Bowel sounds are normal. There is no distension.      Palpations: Abdomen is soft.   Musculoskeletal:         General: Normal range of motion.      Cervical back: Normal range of motion and neck supple.   Skin:     General: Skin is warm and dry.      Findings: No erythema.   Neurological:      Mental Status: She is alert and oriented to person, place, and time.   Psychiatric:         Behavior: Behavior normal.          Result Review :            CXR 2/14/2023  Exam/Procedure: XR CHEST 1 VIEW ordered by SUNG GUPTA, [id number]     CLINICAL INDICATION:   cough; soa     TECHNIQUE:   XR CHEST 1 VIEW     COMPARISON:   10 months prior     FINDINGS:   Mild prominence of the heart size in this technique. Prominent central bronchovascular markings. Accentuated densities in the lung bases partially due to vascular crowding and overlapping soft tissue. Mild edema or infiltrate would be difficult to exclude. No pneumothorax or large effusion.      PFTs in the office today reviewed by me:  No significant changes from April 2022 her FVC is 1.42, 59% predicted with a ratio 79%.  Decreased total lung capacity is 3.04, 63% and normal adjusted DLCO.                 Assessment and Plan    Problem List Items Addressed This Visit        Pulmonary and Pneumonias    Moderate persistent asthma without complication - Primary    Relevant Medications    montelukast (SINGULAIR) 10 MG tablet    Other Relevant Orders    XR chest pa and lateral   Other Visit Diagnoses     Asthma, unspecified asthma severity, unspecified whether complicated, unspecified whether persistent        Relevant Medications    montelukast (SINGULAIR) 10 MG tablet        She was here today for routine testing on her asthma.  Says she has been doing fairly well after her COVID-19.  Her  pulmonary function test are stable from last year.    She would also like to follow-up on her chest x-ray given that she had some mild changes during COVID-19, she can go to [facility] and get that done anytime today, I put the order in.    Continue on her Breo daily.  I would like for her to start back on her montelukast given that she has quite a bit of seasonal allergies.  Continue on her antihistamine.      Follow Up     No follow-ups on file.  Patient was given instructions and counseling regarding her condition or for health maintenance advice. Please see specific information pulled into the AVS if appropriate.         Moderate level of Medical Decision Making complexity based on 2 or more chronic stable illnesses and prescription drug management.    ESSIE Eastman, ACNP  [facility]  Electronically signed

## 2023-08-08 ENCOUNTER — TELEPHONE (OUTPATIENT)
Dept: PULMONOLOGY | Facility: CLINIC | Age: 68
End: 2023-08-08
Payer: COMMERCIAL

## 2023-08-08 RX ORDER — PREDNISONE 10 MG/1
TABLET ORAL
Qty: 31 TABLET | Refills: 0 | Status: SHIPPED | OUTPATIENT
Start: 2023-08-08

## 2023-08-08 RX ORDER — AZITHROMYCIN 250 MG/1
TABLET, FILM COATED ORAL
Qty: 6 TABLET | Refills: 0 | Status: SHIPPED | OUTPATIENT
Start: 2023-08-08

## 2023-08-08 NOTE — TELEPHONE ENCOUNTER
Pt called today c/o cough w/ sputum/wheezing/nasal congestion for 3-4 days. Pt denies fever/chest pain/sob/nausea. Pt requesting abx/steroids needing to be sent to [facility].

## 2023-08-25 ENCOUNTER — OFFICE VISIT (OUTPATIENT)
Dept: PULMONOLOGY | Facility: CLINIC | Age: 68
End: 2023-08-25
Payer: MEDICARE

## 2023-08-25 VITALS
TEMPERATURE: 98 F | DIASTOLIC BLOOD PRESSURE: 64 MMHG | HEART RATE: 106 BPM | HEIGHT: 64 IN | BODY MASS INDEX: 34.37 KG/M2 | OXYGEN SATURATION: 97 % | SYSTOLIC BLOOD PRESSURE: 120 MMHG | WEIGHT: 201.3 LBS

## 2023-08-25 DIAGNOSIS — K21.9 GERD WITHOUT ESOPHAGITIS: ICD-10-CM

## 2023-08-25 DIAGNOSIS — J45.40 MODERATE PERSISTENT ASTHMA WITHOUT COMPLICATION: Primary | ICD-10-CM

## 2023-08-25 PROCEDURE — 3074F SYST BP LT 130 MM HG: CPT | Performed by: NURSE PRACTITIONER

## 2023-08-25 PROCEDURE — 3078F DIAST BP <80 MM HG: CPT | Performed by: NURSE PRACTITIONER

## 2023-08-25 PROCEDURE — 1159F MED LIST DOCD IN RCRD: CPT | Performed by: NURSE PRACTITIONER

## 2023-08-25 PROCEDURE — 1160F RVW MEDS BY RX/DR IN RCRD: CPT | Performed by: NURSE PRACTITIONER

## 2023-08-25 PROCEDURE — 99213 OFFICE O/P EST LOW 20 MIN: CPT | Performed by: NURSE PRACTITIONER

## 2023-08-25 NOTE — PROGRESS NOTES
"[facility] [place] [facility]      Chief Complaint  Asthma and Follow-up    Subjective          Dorene Huizar presents to [facility] for complaints of excessive burping.  She says she feels air going up and down from her stomach.  She has been burping frequently since she ate at [employer] a few days ago.  No significant choking episode or aspiration episode but she did have an episode where she felt like she swallowed a little funny.  Ever since then she has had quite a bit of burping and gas.    She said she did see ear nose and throat and they did a laryngoscopy which was normal.  She has an upper GI pending and follows up with her gastroenterologist next Friday.    She denies any cough or sputum production.  No chest tightness or wheezing.  No shortness of breath.  She continues take her Breo daily.        Objective     Vital Signs:   /64   Pulse 106   Temp 98 øF (36.7 øC)   Ht 162.6 cm (64.02\")   Wt 91.3 kg (201 lb 4.8 oz)   SpO2 97% Comment: resting, room air  BMI 34.53 kg/mý       Immunization History   Administered Date(s) Administered    COVID-19 (MODERNA) 1st,2nd,3rd Dose Monovalent 03/31/2021, 04/28/2021, 12/29/2021    COVID-19 (UNSPECIFIED) 03/31/2021, 04/28/2021, 12/29/2021    Fluad Quad 65+ 11/01/2021    Fluzone High Dose =>65 Years ([employer]care ONLY) 10/20/2018, 11/21/2019, 09/02/2020    Fluzone High-Dose 65+yrs 09/02/2020, 09/12/2022    Hep A / Hep B 12/20/2018    Hep A, Unspecified 11/14/2018    Hepatitis A 11/14/2018    Influenza, Unspecified 10/11/2021    Pneumococcal Conjugate 13-Valent (PCV13) 11/20/2018, 11/21/2019    Pneumococcal Polysaccharide (PPSV23) 08/01/2018, 08/13/2018       Physical Exam  Vitals reviewed.   Constitutional:       Appearance: She is well-developed.   HENT:      Head: Normocephalic and atraumatic.   Eyes:      Pupils: Pupils are equal, round, and reactive to light.   Cardiovascular:      Rate and Rhythm: " Normal rate and regular rhythm.      Heart sounds: No murmur heard.  Pulmonary:      Effort: Pulmonary effort is normal. No respiratory distress.      Breath sounds: Normal breath sounds. No wheezing or rales.   Abdominal:      General: Bowel sounds are normal. There is no distension.      Palpations: Abdomen is soft.   Musculoskeletal:         General: Normal range of motion.      Cervical back: Normal range of motion and neck supple.   Skin:     General: Skin is warm and dry.      Findings: No erythema.   Neurological:      Mental Status: She is alert and oriented to person, place, and time.   Psychiatric:         Behavior: Behavior normal.        Result Review :                         Assessment and Plan    Problem List Items Addressed This Visit          Gastrointestinal Abdominal     GERD without esophagitis       Pulmonary and Pneumonias    Moderate persistent asthma without complication - Primary       She denies and clear today in the office with no respiratory symptoms.  I do think that the belching and gas is likely related to a GI issue, possibly she has a food impaction from when she was eating a [employer], or even esophageal spasms.  She follows up with gastroenterology next week.    She will keep her normal follow-up in October for her asthma.    Follow Up     No follow-ups on file.  Patient was given instructions and counseling regarding her condition or for health maintenance advice. Please see specific information pulled into the AVS if appropriate.       ESSIE Eastman, ACNP  [facility]  Electronically signed

## 2023-08-30 DIAGNOSIS — J45.40 MODERATE PERSISTENT ASTHMA WITHOUT COMPLICATION: ICD-10-CM

## 2023-08-30 RX ORDER — FLUTICASONE FUROATE AND VILANTEROL TRIFENATATE 100; 25 UG/1; UG/1
1 POWDER RESPIRATORY (INHALATION)
Qty: 90 EACH | Refills: 0 | Status: SHIPPED | OUTPATIENT
Start: 2023-08-30

## 2023-10-06 DIAGNOSIS — J45.40 MODERATE PERSISTENT ASTHMA WITHOUT COMPLICATION: ICD-10-CM

## 2023-10-06 RX ORDER — FLUTICASONE FUROATE AND VILANTEROL TRIFENATATE 100; 25 UG/1; UG/1
1 POWDER RESPIRATORY (INHALATION)
Qty: 60 EACH | Refills: 0 | Status: SHIPPED | OUTPATIENT
Start: 2023-10-06

## 2023-10-06 NOTE — TELEPHONE ENCOUNTER
Pt called asking for more samples of Breo Ellipta 100. I informed her that we do not carry samples of Breo Ellipta 100 anymore, but that I could send it to her pharmacy and if it is not covered, we can try to do a PA or find an alternative. Pt verbalized understanding. Sent to [facility] pharmacy on [address].

## 2023-10-18 ENCOUNTER — OFFICE VISIT (OUTPATIENT)
Dept: PULMONOLOGY | Facility: CLINIC | Age: 68
End: 2023-10-18
Payer: MEDICARE

## 2023-10-18 VITALS
WEIGHT: 207.6 LBS | OXYGEN SATURATION: 95 % | TEMPERATURE: 98.7 F | HEART RATE: 90 BPM | BODY MASS INDEX: 35.44 KG/M2 | SYSTOLIC BLOOD PRESSURE: 138 MMHG | DIASTOLIC BLOOD PRESSURE: 70 MMHG | HEIGHT: 64 IN

## 2023-10-18 DIAGNOSIS — J45.40 MODERATE PERSISTENT ASTHMA WITHOUT COMPLICATION: Primary | ICD-10-CM

## 2023-10-18 DIAGNOSIS — J30.89 ENVIRONMENTAL AND SEASONAL ALLERGIES: ICD-10-CM

## 2023-10-18 PROCEDURE — 3078F DIAST BP <80 MM HG: CPT | Performed by: NURSE PRACTITIONER

## 2023-10-18 PROCEDURE — 1160F RVW MEDS BY RX/DR IN RCRD: CPT | Performed by: NURSE PRACTITIONER

## 2023-10-18 PROCEDURE — 3075F SYST BP GE 130 - 139MM HG: CPT | Performed by: NURSE PRACTITIONER

## 2023-10-18 PROCEDURE — 99213 OFFICE O/P EST LOW 20 MIN: CPT | Performed by: NURSE PRACTITIONER

## 2023-10-18 PROCEDURE — 1159F MED LIST DOCD IN RCRD: CPT | Performed by: NURSE PRACTITIONER

## 2023-10-18 NOTE — PROGRESS NOTES
"[facility] Pulmonary Follow up      Chief Complaint  Moderate persistent asthma without complication (Follow up)    Subjective          Dorene Huizar presents to [facility] for routine follow-up.  I follow her here in the office for chronic persistent asthma.  Overall she says she been doing really well lately.  She has not had any recent acute exacerbations or illnesses.  She does have some chronic wheezing as well as her chronic cough.  Her cough did get a bit worse seasonally with the allergies.  Currently she is on Breo daily and uses her rescue inhaler as needed.    Unfortunately she recently fell and hurt her left wrist and broke a couple ribs on her right.  She did have quite a bit of pleuritic pain but used her incentive spirometer to help with any congestion.      Objective     Vital Signs:   /70 (BP Location: Left arm, Patient Position: Sitting, Cuff Size: Adult)   Pulse 90   Temp 98.7 °F (37.1 °C) (Infrared)   Ht 162.6 cm (64\")   Wt 94.2 kg (207 lb 9.6 oz)   SpO2 95% Comment: room air at rest  BMI 35.63 kg/m²       Immunization History   Administered Date(s) Administered    COVID-19 (MODERNA) 1st,2nd,3rd Dose Monovalent 03/31/2021, 04/28/2021, 12/29/2021    COVID-19 (UNSPECIFIED) 03/31/2021, 04/28/2021, 12/29/2021    Fluad Quad 65+ 11/01/2021    Fluzone High Dose =>65 Years (Vaxcare ONLY) 10/20/2018, 11/21/2019, 09/02/2020    Fluzone High-Dose 65+yrs 09/02/2020, 09/12/2022, 09/11/2023    Hep A / Hep B 12/20/2018    Hep A, Unspecified 11/14/2018    Hepatitis A 11/14/2018    Influenza, Unspecified 10/11/2021    Pneumococcal Conjugate 13-Valent (PCV13) 11/20/2018, 11/21/2019    Pneumococcal Polysaccharide (PPSV23) 08/01/2018, 08/13/2018       Physical Exam  Vitals reviewed.   Constitutional:       Appearance: She is well-developed.   HENT:      Head: Normocephalic and atraumatic.   Eyes:      Pupils: Pupils are equal, round, and reactive to " light.   Cardiovascular:      Rate and Rhythm: Normal rate and regular rhythm.      Heart sounds: No murmur heard.  Pulmonary:      Effort: Pulmonary effort is normal. No respiratory distress.      Breath sounds: Wheezing present. No rales.   Abdominal:      General: Bowel sounds are normal. There is no distension.      Palpations: Abdomen is soft.   Musculoskeletal:         General: Normal range of motion.      Cervical back: Normal range of motion and neck supple.   Skin:     General: Skin is warm and dry.      Findings: No erythema.   Neurological:      Mental Status: She is alert and oriented to person, place, and time.   Psychiatric:         Behavior: Behavior normal.          Result Review :                         Assessment and Plan    Problem List Items Addressed This Visit          Allergies and Adverse Reactions    Environmental and seasonal allergies       Pulmonary and Pneumonias    Moderate persistent asthma without complication - Primary     This time she generally well.  She will continue on her Breo with her albuterol HFA or nebulizer as needed.  Continue on her antihistamine and Singulair.    Routine follow-up in 6 months with full PFTs.      Follow Up     No follow-ups on file.  Patient was given instructions and counseling regarding her condition or for health maintenance advice. Please see specific information pulled into the AVS if appropriate.         ESSIE Eastman, ACNP  [facility]  Electronically signed

## 2024-02-06 DIAGNOSIS — J45.40 MODERATE PERSISTENT ASTHMA WITHOUT COMPLICATION: ICD-10-CM

## 2024-02-06 RX ORDER — FLUTICASONE FUROATE AND VILANTEROL TRIFENATATE 100; 25 UG/1; UG/1
1 POWDER RESPIRATORY (INHALATION) DAILY
Qty: 60 EACH | Refills: 0 | Status: SHIPPED | OUTPATIENT
Start: 2024-02-06

## 2024-04-18 ENCOUNTER — OFFICE VISIT (OUTPATIENT)
Dept: PULMONOLOGY | Facility: CLINIC | Age: 69
End: 2024-04-18
Payer: MEDICARE

## 2024-04-18 VITALS
WEIGHT: 205 LBS | RESPIRATION RATE: 16 BRPM | BODY MASS INDEX: 35 KG/M2 | SYSTOLIC BLOOD PRESSURE: 132 MMHG | TEMPERATURE: 97.8 F | HEART RATE: 89 BPM | OXYGEN SATURATION: 94 % | DIASTOLIC BLOOD PRESSURE: 70 MMHG | HEIGHT: 64 IN

## 2024-04-18 DIAGNOSIS — J45.40 MODERATE PERSISTENT ASTHMA WITHOUT COMPLICATION: Primary | ICD-10-CM

## 2024-04-18 DIAGNOSIS — J45.909 ASTHMA, UNSPECIFIED ASTHMA SEVERITY, UNSPECIFIED WHETHER COMPLICATED, UNSPECIFIED WHETHER PERSISTENT: ICD-10-CM

## 2024-04-18 DIAGNOSIS — J30.89 ENVIRONMENTAL AND SEASONAL ALLERGIES: ICD-10-CM

## 2024-04-18 RX ORDER — LEVOTHYROXINE SODIUM 0.12 MG/1
TABLET ORAL
COMMUNITY
Start: 2024-01-08

## 2024-04-18 RX ORDER — MONTELUKAST SODIUM 10 MG/1
TABLET ORAL
Qty: 90 TABLET | Refills: 3 | Status: SHIPPED | OUTPATIENT
Start: 2024-04-18

## 2024-04-18 RX ORDER — AMOXICILLIN 875 MG/1
875 TABLET, COATED ORAL
COMMUNITY
Start: 2024-04-12 | End: 2024-04-19

## 2024-04-18 RX ORDER — CALCITRIOL 0.25 UG/1
1 CAPSULE, LIQUID FILLED ORAL 2 TIMES DAILY
COMMUNITY
Start: 2024-04-16

## 2024-04-18 RX ORDER — POTASSIUM CHLORIDE 1.5 G/1.58G
POWDER, FOR SOLUTION ORAL EVERY 12 HOURS SCHEDULED
COMMUNITY

## 2024-04-18 RX ORDER — PSEUDOEPHEDRINE HCL 30 MG
TABLET ORAL
COMMUNITY

## 2024-04-18 RX ORDER — FLUTICASONE FUROATE AND VILANTEROL TRIFENATATE 100; 25 UG/1; UG/1
1 POWDER RESPIRATORY (INHALATION) DAILY
Qty: 1 EACH | Refills: 11 | Status: SHIPPED | OUTPATIENT
Start: 2024-04-18

## 2024-04-18 NOTE — PROGRESS NOTES
"[facility] Pulmonary Follow up      Chief Complaint  Follow-up and Asthma    Subjective          Dorene Huizar presents to [facility] for routine follow-up on her asthma.  She does had an ER visit over at [facility] on the 12th and received a steroid via her IV as well as a round of antibiotics that she will finish tomorrow.  She was having quite a bit of cough and congestion with mucus.  She is much better now she is not having any tightness or shortness of breath.    She continues on her Breo daily.  She uses her albuterol as needed but mostly when the weather changes or when she is wheezing.  She does not use her nebulizer.          Objective     Vital Signs:   /70 (BP Location: Right arm, Patient Position: Sitting)   Pulse 89   Temp 97.8 °F (36.6 °C)   Resp 16   Ht 162.6 cm (64\")   Wt 93 kg (205 lb)   SpO2 94% Comment: room air  BMI 35.19 kg/m²       Immunization History   Administered Date(s) Administered    COVID-19 (MODERNA) 1st,2nd,3rd Dose Monovalent 03/31/2021, 04/28/2021, 12/29/2021    COVID-19 (UNSPECIFIED) 03/31/2021, 04/28/2021, 12/29/2021    Fluad Quad 65+ 11/01/2021    Fluzone High Dose =>65 Years ([employer] ONLY) 10/20/2018, 11/21/2019, 09/02/2020    Fluzone High-Dose 65+yrs 09/02/2020, 09/12/2022, 09/11/2023    Hep A / Hep B 12/20/2018    Hep A, Unspecified 11/14/2018    Hepatitis A 11/14/2018    Influenza, Unspecified 10/11/2021    Pneumococcal Conjugate 13-Valent (PCV13) 11/20/2018, 11/21/2019    Pneumococcal Polysaccharide (PPSV23) 08/01/2018, 08/13/2018       Physical Exam  Vitals reviewed.   Constitutional:       Appearance: She is well-developed.   HENT:      Head: Normocephalic and atraumatic.   Eyes:      Pupils: Pupils are equal, round, and reactive to light.   Cardiovascular:      Rate and Rhythm: Normal rate and regular rhythm.      Heart sounds: No murmur heard.  Pulmonary:      Effort: Pulmonary effort is normal. No " respiratory distress.      Breath sounds: Normal breath sounds. No wheezing or rales.   Abdominal:      General: Bowel sounds are normal. There is no distension.      Palpations: Abdomen is soft.   Musculoskeletal:         General: Normal range of motion.      Cervical back: Normal range of motion and neck supple.   Skin:     General: Skin is warm and dry.      Findings: No erythema.   Neurological:      Mental Status: She is alert and oriented to person, place, and time.   Psychiatric:         Behavior: Behavior normal.          Result Review :            XR Chest 1 View  Order: [id number]  Impression    1. Minimal scarring at left base, as noted.      Images reviewed, interpreted, and dictated by Caleb Robbins MD  Narrative    Name: JUNIOR HUIZAR : 1955        CHEST SINGLE VIEW    HISTORY: Shortness of breath.    FINDINGS: Cardiac silhouette is of normal size.    Mild linear scarring is identified at the left base. Right lung is  clear.      PFTs done in the office today do show some restriction but not changed from last year with an FVC of 1.35, 57% predicted and total lung capacity of 3.08, 64% predicted.  Normal adjusted DLCO.                 Assessment and Plan    Problem List Items Addressed This Visit          Allergies and Adverse Reactions    Environmental and seasonal allergies       Pulmonary and Pneumonias    Moderate persistent asthma without complication - Primary    Relevant Medications    montelukast (SINGULAIR) 10 MG tablet    Breo Ellipta 100-25 MCG/ACT aerosol powder     Other Relevant Orders    Spirometry with Diffusion Capacity & Lung Volumes (Completed)     Other Visit Diagnoses       Asthma, unspecified asthma severity, unspecified whether complicated, unspecified whether persistent        Relevant Medications    montelukast (SINGULAIR) 10 MG tablet    Breo Ellipta 100-25 MCG/ACT aerosol powder           At this time Mrs. Huizar doing fairly well, she had a recent episode ended up  in the emergency department with some IV steroids and antibiotics.  Says she doing much better now.  We went over her PFTs which are stable, she had chest x-ray done at the hospital that showed no acute process.    Continue on her Breo daily.  Continue on her antihistamine and Singulair for her allergies.      Follow Up     Return in about 6 months (around 10/18/2024).  Patient was given instructions and counseling regarding her condition or for health maintenance advice. Please see specific information pulled into the AVS if appropriate.     Moderate level of Medical Decision Making complexity based on 2 or more chronic stable illnesses and prescription drug management.    ESSIE Eastman, ACNP  [facility]  Electronically signed

## 2024-09-03 DIAGNOSIS — J45.40 MODERATE PERSISTENT ASTHMA WITHOUT COMPLICATION: ICD-10-CM

## 2024-09-03 RX ORDER — FLUTICASONE FUROATE AND VILANTEROL 100; 25 UG/1; UG/1
1 POWDER RESPIRATORY (INHALATION) DAILY
Qty: 60 EACH | OUTPATIENT
Start: 2024-09-03

## 2024-10-03 ENCOUNTER — TELEPHONE (OUTPATIENT)
Dept: PULMONOLOGY | Facility: CLINIC | Age: 69
End: 2024-10-03

## 2024-10-03 ENCOUNTER — OFFICE VISIT (OUTPATIENT)
Dept: PULMONOLOGY | Facility: CLINIC | Age: 69
End: 2024-10-03
Payer: MEDICARE

## 2024-10-03 VITALS
BODY MASS INDEX: 35.51 KG/M2 | HEART RATE: 88 BPM | WEIGHT: 208 LBS | HEIGHT: 64 IN | TEMPERATURE: 98 F | SYSTOLIC BLOOD PRESSURE: 120 MMHG | OXYGEN SATURATION: 97 % | DIASTOLIC BLOOD PRESSURE: 90 MMHG

## 2024-10-03 DIAGNOSIS — J45.40 MODERATE PERSISTENT ASTHMA WITHOUT COMPLICATION: Primary | ICD-10-CM

## 2024-10-03 DIAGNOSIS — J40 BRONCHITIS: ICD-10-CM

## 2024-10-03 DIAGNOSIS — J30.89 ENVIRONMENTAL AND SEASONAL ALLERGIES: ICD-10-CM

## 2024-10-03 PROCEDURE — 1159F MED LIST DOCD IN RCRD: CPT | Performed by: NURSE PRACTITIONER

## 2024-10-03 PROCEDURE — 99214 OFFICE O/P EST MOD 30 MIN: CPT | Performed by: NURSE PRACTITIONER

## 2024-10-03 PROCEDURE — 1160F RVW MEDS BY RX/DR IN RCRD: CPT | Performed by: NURSE PRACTITIONER

## 2024-10-03 PROCEDURE — 3074F SYST BP LT 130 MM HG: CPT | Performed by: NURSE PRACTITIONER

## 2024-10-03 PROCEDURE — 3080F DIAST BP >= 90 MM HG: CPT | Performed by: NURSE PRACTITIONER

## 2024-10-03 RX ORDER — ALBUTEROL SULFATE 0.83 MG/ML
2.5 SOLUTION RESPIRATORY (INHALATION) 4 TIMES DAILY PRN
Start: 2024-10-03

## 2024-10-03 RX ORDER — ALBUTEROL SULFATE 0.83 MG/ML
2.5 SOLUTION RESPIRATORY (INHALATION) 4 TIMES DAILY PRN
Qty: 120 EACH | Refills: 2 | Status: SHIPPED | OUTPATIENT
Start: 2024-10-03

## 2024-10-03 RX ORDER — DEXTROMETHORPHAN HYDROBROMIDE AND PROMETHAZINE HYDROCHLORIDE 15; 6.25 MG/5ML; MG/5ML
5 SYRUP ORAL 4 TIMES DAILY PRN
Qty: 180 ML | Refills: 0 | Status: SHIPPED | OUTPATIENT
Start: 2024-10-03

## 2024-10-03 RX ORDER — PREDNISONE 10 MG/1
TABLET ORAL
Qty: 31 TABLET | Refills: 0 | Status: SHIPPED | OUTPATIENT
Start: 2024-10-03

## 2024-10-03 RX ORDER — AZELASTINE HYDROCHLORIDE 137 UG/1
SPRAY, METERED NASAL
COMMUNITY
Start: 2024-01-26

## 2024-10-03 RX ORDER — FLUTICASONE PROPIONATE 50 UG/1
SPRAY, METERED NASAL
COMMUNITY
Start: 2024-07-29

## 2024-10-03 NOTE — TELEPHONE ENCOUNTER
Pt called stating that Rx Promethazine Cough Syrup that was sent to her pharmacy today that she will not be able to  due to the cost. Pt has been advised to contact her insurance to see if they will be able to cover another cough syrup and check the price. Pt verbalized understanding but wants to know if there will be something different that you can send to her pharmacy.

## 2024-10-03 NOTE — TELEPHONE ENCOUNTER
Pt notified to take OTC cough syrup such as Delsym. Albuterol Neb Sol will be sent to [facility] to fill. Pt verbalized understanding.

## 2024-10-03 NOTE — PROGRESS NOTES
"[facility] Pulmonary Follow up      Chief Complaint  Cough (x5 days)    Subjective          Dorene Huizar presents to [facility] for cough and congestion for the last few days.  For about 5 days with the weather change she has had worsening cough and congestion.  She had she also got caught out in the rain and was wonder if she had bit of bronchitis.  She denies any fevers or chills.  No body aches.  She does complain of some chest tightness and wheezing.    She has been using her Breo daily and her albuterol as needed.  She does not have nebulizers at home, she has in the past but not currently.        Objective     Vital Signs:   /90   Pulse 88   Temp 98 °F (36.7 °C)   Ht 162.6 cm (64.02\")   Wt 94.3 kg (208 lb)   SpO2 97% Comment: room air at rest  BMI 35.69 kg/m²       Immunization History   Administered Date(s) Administered    COVID-19 (MODERNA) 12YRS+ (SPIKEVAX) 09/17/2024    COVID-19 (MODERNA) 1st,2nd,3rd Dose Monovalent 03/31/2021, 04/28/2021, 12/29/2021    COVID-19 (UNSPECIFIED) 03/31/2021, 04/28/2021, 12/29/2021    Fluad Quad 65+ 11/01/2021    Fluzone High-Dose 65+YRS 10/20/2018, 11/21/2019, 09/02/2020    Fluzone High-Dose 65+yrs 09/02/2020, 09/12/2022, 09/11/2023    Hep A / Hep B 12/20/2018    Hep A, Unspecified 11/14/2018    Hepatitis A 11/14/2018    Influenza, Unspecified 10/11/2021    Pneumococcal Conjugate 13-Valent (PCV13) 11/20/2018, 11/21/2019    Pneumococcal Polysaccharide (PPSV23) 08/01/2018, 08/13/2018       Physical Exam     Result Review :            PFTs done in the office today:    PA and lateral chest x-ray done the office today reviewed by me  Awaiting final MD interpretation                 Assessment and Plan    Problem List Items Addressed This Visit          Allergies and Adverse Reactions    Environmental and seasonal allergies       Pulmonary and Pneumonias    Moderate persistent asthma without complication - Primary    " Relevant Medications    albuterol (PROVENTIL) (2.5 MG/3ML) 0.083% nebulizer solution    Other Relevant Orders    Home Nebulizer     Other Visit Diagnoses       Bronchitis        Relevant Medications    fluticasone (FLONASE) 50 MCG/ACT nasal spray    Azelastine HCl 137 MCG/SPRAY solution    promethazine-dextromethorphan (PROMETHAZINE-DM) 6.25-15 MG/5ML syrup    albuterol (PROVENTIL) (2.5 MG/3ML) 0.083% nebulizer solution          Mrs. Huizar does seem to be having an acute exacerbation of her asthma with some bronchitis.  Also had to give a round of antibiotics and steroids.  Also give her a bit of cough syrup since she is being kept up at night due to her cough.    It would be helpful for her to have some nebulizers at home to use as needed during these episodes.  Will send that to her pharmacy today.  She will also need a nebulizer machine, will get that through a DME.    Continue on her Breo daily.      Follow Up     No follow-ups on file.  Patient was given instructions and counseling regarding her condition or for health maintenance advice. Please see specific information pulled into the AVS if appropriate.         Moderate level of Medical Decision Making complexity based on 2 or more chronic stable illnesses and prescription drug management.    ESSIE Eastman, ACNP  [facility]  Electronically signed

## 2024-10-18 DIAGNOSIS — J45.40 MODERATE PERSISTENT ASTHMA WITHOUT COMPLICATION: Primary | ICD-10-CM

## 2025-01-09 ENCOUNTER — OFFICE VISIT (OUTPATIENT)
Dept: PULMONOLOGY | Facility: CLINIC | Age: 70
End: 2025-01-09
Payer: MEDICARE

## 2025-01-09 VITALS
HEIGHT: 64 IN | HEART RATE: 59 BPM | SYSTOLIC BLOOD PRESSURE: 120 MMHG | TEMPERATURE: 96.9 F | OXYGEN SATURATION: 93 % | WEIGHT: 202 LBS | DIASTOLIC BLOOD PRESSURE: 70 MMHG | BODY MASS INDEX: 34.49 KG/M2

## 2025-01-09 DIAGNOSIS — J45.50 SEVERE PERSISTENT ASTHMA WITHOUT COMPLICATION: Primary | ICD-10-CM

## 2025-01-09 DIAGNOSIS — J30.89 ENVIRONMENTAL AND SEASONAL ALLERGIES: ICD-10-CM

## 2025-01-09 DIAGNOSIS — J45.909 ASTHMA, UNSPECIFIED ASTHMA SEVERITY, UNSPECIFIED WHETHER COMPLICATED, UNSPECIFIED WHETHER PERSISTENT: ICD-10-CM

## 2025-01-09 LAB
BASOPHILS # BLD AUTO: 0.02 10*3/MM3 (ref 0–0.2)
BASOPHILS NFR BLD AUTO: 0.4 % (ref 0–1.5)
DEPRECATED RDW RBC AUTO: 40.7 FL (ref 37–54)
EOSINOPHIL # BLD AUTO: 0.01 10*3/MM3 (ref 0–0.4)
EOSINOPHIL NFR BLD AUTO: 0.2 % (ref 0.3–6.2)
ERYTHROCYTE [DISTWIDTH] IN BLOOD BY AUTOMATED COUNT: 14.5 % (ref 12.3–15.4)
HCT VFR BLD AUTO: 39.1 % (ref 34–46.6)
HGB BLD-MCNC: 12.4 G/DL (ref 12–15.9)
IMM GRANULOCYTES # BLD AUTO: 0.02 10*3/MM3 (ref 0–0.05)
IMM GRANULOCYTES NFR BLD AUTO: 0.4 % (ref 0–0.5)
LYMPHOCYTES # BLD AUTO: 1.27 10*3/MM3 (ref 0.7–3.1)
LYMPHOCYTES NFR BLD AUTO: 24.7 % (ref 19.6–45.3)
MCH RBC QN AUTO: 24.8 PG (ref 26.6–33)
MCHC RBC AUTO-ENTMCNC: 31.7 G/DL (ref 31.5–35.7)
MCV RBC AUTO: 78.4 FL (ref 79–97)
MONOCYTES # BLD AUTO: 0.71 10*3/MM3 (ref 0.1–0.9)
MONOCYTES NFR BLD AUTO: 13.8 % (ref 5–12)
NEUTROPHILS NFR BLD AUTO: 3.11 10*3/MM3 (ref 1.7–7)
NEUTROPHILS NFR BLD AUTO: 60.5 % (ref 42.7–76)
NRBC BLD AUTO-RTO: 0 /100 WBC (ref 0–0.2)
PLATELET # BLD AUTO: 264 10*3/MM3 (ref 140–450)
PMV BLD AUTO: 11 FL (ref 6–12)
RBC # BLD AUTO: 4.99 10*6/MM3 (ref 3.77–5.28)
WBC NRBC COR # BLD AUTO: 5.14 10*3/MM3 (ref 3.4–10.8)

## 2025-01-09 PROCEDURE — 86003 ALLG SPEC IGE CRUDE XTRC EA: CPT | Performed by: NURSE PRACTITIONER

## 2025-01-09 PROCEDURE — 85025 COMPLETE CBC W/AUTO DIFF WBC: CPT | Performed by: NURSE PRACTITIONER

## 2025-01-09 PROCEDURE — 82785 ASSAY OF IGE: CPT | Performed by: NURSE PRACTITIONER

## 2025-01-09 RX ORDER — MONTELUKAST SODIUM 10 MG/1
TABLET ORAL
Qty: 90 TABLET | Refills: 3 | Status: SHIPPED | OUTPATIENT
Start: 2025-01-09

## 2025-01-09 RX ORDER — CALCIUM CARBONATE 500 MG/1
1 TABLET, CHEWABLE ORAL DAILY
COMMUNITY

## 2025-01-09 RX ORDER — LEVOCETIRIZINE DIHYDROCHLORIDE 5 MG/1
5 TABLET, FILM COATED ORAL EVERY EVENING
Qty: 90 TABLET | Refills: 3 | Status: SHIPPED | OUTPATIENT
Start: 2025-01-09

## 2025-01-09 RX ORDER — FLUTICASONE FUROATE AND VILANTEROL 200; 25 UG/1; UG/1
1 POWDER RESPIRATORY (INHALATION)
Qty: 1 EACH | Refills: 5 | Status: SHIPPED | OUTPATIENT
Start: 2025-01-09

## 2025-01-09 RX ORDER — PREDNISONE 10 MG/1
TABLET ORAL
Qty: 31 TABLET | Refills: 0 | Status: SHIPPED | OUTPATIENT
Start: 2025-01-09

## 2025-01-09 NOTE — PROGRESS NOTES
"[facility] Pulmonary Follow up      Chief Complaint  Asthma (F/u)    Subjective          Dorene Huizar presents to [facility] for follow up on her asthma.   I last saw her in October when she was having an acute exacerbation.  She was on a round of antibiotics and steroids.    Recently she Her nieces kids who were coughing and woke up the next couple days coughing and congestion.  Since then she has had quite a bit of worsening head congestion cough and shortness of breath.    She does continue on her Breo 100 mcg dose.  She has not been taking her antihistamine or Singulair, she is out.    On her last visit I started her on some nebulizers she has been using those 1-2 times a day.  She does feel like those helped some.      Objective     Vital Signs:   /70   Pulse 59   Temp 96.9 °F (36.1 °C) (Infrared)   Ht 162.6 cm (64.02\")   Wt 91.6 kg (202 lb)   SpO2 93% Comment: room air at rest  BMI 34.65 kg/m²       Immunization History   Administered Date(s) Administered    COVID-19 (MODERNA) 12YRS+ (SPIKEVAX) 09/17/2024    COVID-19 (MODERNA) 1st,2nd,3rd Dose Monovalent 03/31/2021, 04/28/2021, 12/29/2021    COVID-19 (UNSPECIFIED) 03/31/2021, 04/28/2021, 12/29/2021    Fluad Quad 65+ 11/01/2021    Fluzone (or Fluarix & Flulaval for VFC) >6mos 10/26/2018    Fluzone High-Dose 65+YRS 10/20/2018, 11/21/2019, 09/02/2020, 09/17/2024    Fluzone High-Dose 65+yrs 09/02/2020, 09/12/2022, 09/11/2023    Hep A / Hep B 12/20/2018    Hep A, Unspecified 11/14/2018    Hepatitis A 11/14/2018    Influenza, Unspecified 10/11/2021    Pneumococcal Conjugate 13-Valent (PCV13) 11/20/2018, 11/21/2019    Pneumococcal Polysaccharide (PPSV23) 08/01/2018, 08/13/2018       Physical Exam  Vitals reviewed.   Constitutional:       General: She is not in acute distress.     Appearance: She is well-developed.   HENT:      Head: Normocephalic and atraumatic.   Eyes:      Pupils: Pupils are equal, " round, and reactive to light.   Cardiovascular:      Rate and Rhythm: Normal rate and regular rhythm.      Heart sounds: No murmur heard.  Pulmonary:      Effort: Pulmonary effort is normal. No respiratory distress.      Breath sounds: Wheezing and rhonchi present. No rales.   Abdominal:      General: Bowel sounds are normal. There is no distension.      Palpations: Abdomen is soft.   Musculoskeletal:         General: Normal range of motion.      Cervical back: Normal range of motion and neck supple.   Skin:     General: Skin is warm and dry.      Findings: No erythema.   Neurological:      Mental Status: She is alert and oriented to person, place, and time.   Psychiatric:         Behavior: Behavior normal.          Result Review :            PA and lateral chest x-ray done the office today reviewed by me  Awaiting final MD interpretation    10/30/2024  # Eos  0.04 - 0.36 K/µL 0.63 High                   Assessment and Plan    Problem List Items Addressed This Visit          Allergies and Adverse Reactions    Environmental and seasonal allergies       Pulmonary and Pneumonias    Severe persistent asthma without complication - Primary    Relevant Medications    Fluticasone Furoate-Vilanterol (BREO ELLIPTA) 200-25 MCG/ACT inhaler    montelukast (SINGULAIR) 10 MG tablet    Other Relevant Orders    XR Chest PA & Lateral (Completed)    CBC & Differential    IgE + Allergens (22)     Other Visit Diagnoses       Asthma, unspecified asthma severity, unspecified whether complicated, unspecified whether persistent        Relevant Medications    Fluticasone Furoate-Vilanterol (BREO ELLIPTA) 200-25 MCG/ACT inhaler    montelukast (SINGULAIR) 10 MG tablet          Mrs. Huizar does have severe persistent asthma.  She has had multiple exacerbations recently as well as some emergency department visits.  She is certainly has seasonal exacerbations with frequent prednisone use.    We discussed the role of biologic agents with asthma  today in the office.  I am going to recheck some labs but I think she be an excellent candidate.  She would like to come to the office to get her shots, Fasenra would be a great option due to the every 8-week dosing.    She is having acute exacerbation again due to being exposed to sick kids.  A go ahead and put her on a round of antibiotics and steroids.  I also like for her to resume her antihistamine and Singulair I sent that in for her today.  I will also increase her Breo up to the 200 mcg dose.    Routine follow-up in 3 months or sooner as needed.        Follow Up     No follow-ups on file.  Patient was given instructions and counseling regarding her condition or for health maintenance advice. Please see specific information pulled into the AVS if appropriate.       Moderate level of Medical Decision Making complexity based on 2 or more chronic stable illnesses and prescription drug management.    ESSIE Eastman, ACNP  [facility] Pulmonary Critical Care Medicine  Electronically signed

## 2025-01-14 LAB
A ALTERNATA IGE QN: <0.1 KU/L
A FUMIGATUS IGE QN: <0.1 KU/L
BERMUDA GRASS IGE QN: <0.1 KU/L
BOXELDER IGE QN: <0.1 KU/L
C HERBARUM IGE QN: <0.1 KU/L
CAT DANDER IGE QN: <0.1 KU/L
COMMON RAGWEED IGE QN: <0.1 KU/L
CONV CLASS DESCRIPTION: ABNORMAL
COTTONWOOD IGE QN: <0.1 KU/L
D FARINAE IGE QN: 0.39 KU/L
D PTERONYSS IGE QN: 0.56 KU/L
DOG DANDER IGE QN: <0.1 KU/L
IGE SERPL-ACNC: 116 IU/ML (ref 6–495)
MT JUNIPER IGE QN: <0.1 KU/L
NETTLE IGE QN: <0.1 KU/L
P NOTATUM IGE QN: <0.1 KU/L
ROACH IGE QN: 0.13 KU/L
SALTWORT IGE QN: <0.1 KU/L
SHEEP SORREL IGE QN: <0.1 KU/L
TIMOTHY IGE QN: <0.1 KU/L
WHITE ASH IGE QN: <0.1 KU/L
WHITE ELM IGE QN: <0.1 KU/L
WHITE MULBERRY IGE QN: <0.1 KU/L
WHITE OAK IGE QN: <0.1 KU/L

## 2025-02-10 ENCOUNTER — TELEPHONE (OUTPATIENT)
Dept: PULMONOLOGY | Facility: CLINIC | Age: 70
End: 2025-02-10
Payer: COMMERCIAL

## 2025-03-05 DIAGNOSIS — J45.50 SEVERE PERSISTENT ASTHMA WITHOUT COMPLICATION: Primary | ICD-10-CM

## 2025-03-05 RX ORDER — ALBUTEROL SULFATE 0.83 MG/ML
2.5 SOLUTION RESPIRATORY (INHALATION) 4 TIMES DAILY PRN
Qty: 360 ML | Refills: 11
Start: 2025-03-05

## 2025-03-05 RX ORDER — FLUTICASONE FUROATE AND VILANTEROL 200; 25 UG/1; UG/1
1 POWDER RESPIRATORY (INHALATION)
Qty: 1 EACH | Refills: 5 | Status: SHIPPED | OUTPATIENT
Start: 2025-03-05

## 2025-04-14 ENCOUNTER — OFFICE VISIT (OUTPATIENT)
Dept: PULMONOLOGY | Facility: CLINIC | Age: 70
End: 2025-04-14
Payer: COMMERCIAL

## 2025-04-14 VITALS
BODY MASS INDEX: 37.39 KG/M2 | DIASTOLIC BLOOD PRESSURE: 80 MMHG | HEART RATE: 93 BPM | OXYGEN SATURATION: 94 % | HEIGHT: 64 IN | SYSTOLIC BLOOD PRESSURE: 122 MMHG | TEMPERATURE: 98 F | WEIGHT: 219 LBS

## 2025-04-14 DIAGNOSIS — J45.51 SEVERE PERSISTENT ASTHMA WITH ACUTE EXACERBATION: Primary | ICD-10-CM

## 2025-04-14 DIAGNOSIS — J30.89 ENVIRONMENTAL AND SEASONAL ALLERGIES: ICD-10-CM

## 2025-04-14 PROCEDURE — 3079F DIAST BP 80-89 MM HG: CPT | Performed by: NURSE PRACTITIONER

## 2025-04-14 PROCEDURE — 94729 DIFFUSING CAPACITY: CPT | Performed by: NURSE PRACTITIONER

## 2025-04-14 PROCEDURE — 1160F RVW MEDS BY RX/DR IN RCRD: CPT | Performed by: NURSE PRACTITIONER

## 2025-04-14 PROCEDURE — 1159F MED LIST DOCD IN RCRD: CPT | Performed by: NURSE PRACTITIONER

## 2025-04-14 PROCEDURE — 99214 OFFICE O/P EST MOD 30 MIN: CPT | Performed by: NURSE PRACTITIONER

## 2025-04-14 PROCEDURE — 3074F SYST BP LT 130 MM HG: CPT | Performed by: NURSE PRACTITIONER

## 2025-04-14 PROCEDURE — 94375 RESPIRATORY FLOW VOLUME LOOP: CPT | Performed by: NURSE PRACTITIONER

## 2025-04-14 PROCEDURE — 94726 PLETHYSMOGRAPHY LUNG VOLUMES: CPT | Performed by: NURSE PRACTITIONER

## 2025-04-14 RX ORDER — ALBUTEROL SULFATE 90 UG/1
2 INHALANT RESPIRATORY (INHALATION) EVERY 4 HOURS PRN
Qty: 18 G | Refills: 5 | Status: SHIPPED | OUTPATIENT
Start: 2025-04-14

## 2025-04-14 NOTE — PROGRESS NOTES
"[facility] Pulmonary Follow up      Chief Complaint  Severe persistent asthma without complication    Subjective          Dorene Huizar presents to [facility] for follow-up on her asthma.    She continues on her Breo daily.  She has noticed increased wheezing and some chest tightness last 24 hours.  She has a dry nonproductive cough.  She needs a refill of her albuterol HFA.  She does have plenty of albuterol nebulizers but has not been using those last couple days.    On her last visit we did some testing for her asthma including allergies and her IgE.  She was only allergic to dust and her IgE was normal.  Her eosinophils were also normal.          Objective     Vital Signs:   /80   Pulse 93   Temp 98 °F (36.7 °C)   Ht 162.6 cm (64.02\")   Wt 99.3 kg (219 lb)   SpO2 94% Comment: room air at rest  BMI 37.57 kg/m²       Immunization History   Administered Date(s) Administered    COVID-19 (MODERNA) 12YRS+ (SPIKEVAX) 09/17/2024    COVID-19 (MODERNA) 1st,2nd,3rd Dose Monovalent 03/31/2021, 04/28/2021, 12/29/2021    COVID-19 (UNSPECIFIED) 03/31/2021, 04/28/2021, 12/29/2021    Fluad Quad 65+ 11/01/2021    Fluzone (or Fluarix & Flulaval for VFC) >6mos 10/26/2018    Fluzone High-Dose 65+YRS 10/20/2018, 11/21/2019, 09/02/2020, 09/17/2024    Fluzone High-Dose 65+yrs 09/02/2020, 09/12/2022, 09/11/2023    Hep A / Hep B 12/20/2018    Hep A, Unspecified 11/14/2018    Hepatitis A 11/14/2018    Influenza, Unspecified 10/11/2021    Pneumococcal Conjugate 13-Valent (PCV13) 11/20/2018, 11/21/2019    Pneumococcal Polysaccharide (PPSV23) 08/01/2018, 08/13/2018       Physical Exam  Vitals reviewed.   Constitutional:       Appearance: She is well-developed.   HENT:      Head: Normocephalic and atraumatic.   Eyes:      Pupils: Pupils are equal, round, and reactive to light.   Cardiovascular:      Rate and Rhythm: Normal rate and regular rhythm.      Heart sounds: No murmur " heard.  Pulmonary:      Effort: Pulmonary effort is normal. No respiratory distress.      Breath sounds: Normal breath sounds. No wheezing or rales.   Abdominal:      General: Bowel sounds are normal. There is no distension.      Palpations: Abdomen is soft.   Musculoskeletal:         General: Normal range of motion.      Cervical back: Normal range of motion and neck supple.   Skin:     General: Skin is warm and dry.      Findings: No erythema.   Neurological:      Mental Status: She is alert and oriented to person, place, and time.   Psychiatric:         Behavior: Behavior normal.     Result Review :            PFTs done in the office today:  Much worse restriction today with an FVC of 1.02, 35% predicted.                 Assessment and Plan    Problem List Items Addressed This Visit          Allergies and Adverse Reactions    Environmental and seasonal allergies     Other Visit Diagnoses         Severe persistent asthma with acute exacerbation    -  Primary    Relevant Medications    albuterol sulfate  (90 Base) MCG/ACT inhaler    Other Relevant Orders    Spirometry with Diffusion Capacity & Lung Volumes (Completed)            Ms. Huizar is wheezing today in the office with an exacerbation of her asthma.  We went over her PFTs with significant restriction compared to last year.  I would like for her to repeat her PFTs when she is improving, and back to baseline.    Continue on her Breo daily.  I did encourage her to use her nebulizers the next few days to help with the wheezing and cough.    She does have a prednisone taper from January that she did not take, I did go ahead and tell her to start on the prednisone for her exacerbation.    Routine follow-up in 3 months, sooner with any acute issues.    Follow Up     Return in about 3 months (around 7/14/2025).  Patient was given instructions and counseling regarding her condition or for health maintenance advice. Please see specific information pulled into  the AVS if appropriate.     Moderate level of Medical Decision Making complexity based on 2 or more chronic stable illnesses and prescription drug management.    ESSIE Eastman, ACNP  [facility]  Electronically signed

## 2025-04-17 ENCOUNTER — TELEPHONE (OUTPATIENT)
Dept: PULMONOLOGY | Facility: CLINIC | Age: 70
End: 2025-04-17
Payer: COMMERCIAL

## 2025-04-17 NOTE — TELEPHONE ENCOUNTER
Per fax Rx Breo 200 will only be covered through pt's insurance Brand Name Only. Pharmacy has filled and ready for .

## 2025-07-01 ENCOUNTER — OFFICE VISIT (OUTPATIENT)
Dept: PULMONOLOGY | Facility: CLINIC | Age: 70
End: 2025-07-01
Payer: MEDICARE

## 2025-07-01 VITALS
HEIGHT: 64 IN | SYSTOLIC BLOOD PRESSURE: 130 MMHG | TEMPERATURE: 98 F | OXYGEN SATURATION: 99 % | DIASTOLIC BLOOD PRESSURE: 62 MMHG | BODY MASS INDEX: 37.22 KG/M2 | WEIGHT: 218 LBS | HEART RATE: 76 BPM

## 2025-07-01 DIAGNOSIS — J45.51 SEVERE PERSISTENT ASTHMA WITH ACUTE EXACERBATION: ICD-10-CM

## 2025-07-01 DIAGNOSIS — J45.50 SEVERE PERSISTENT ASTHMA WITHOUT COMPLICATION: ICD-10-CM

## 2025-07-01 DIAGNOSIS — J30.89 ENVIRONMENTAL AND SEASONAL ALLERGIES: Primary | ICD-10-CM

## 2025-07-01 RX ORDER — FERROUS SULFATE 325(65) MG
1 TABLET ORAL DAILY
COMMUNITY
Start: 2025-05-15

## 2025-07-01 RX ORDER — ALBUTEROL SULFATE 90 UG/1
2 INHALANT RESPIRATORY (INHALATION) EVERY 4 HOURS PRN
Qty: 18 G | Refills: 5 | Status: SHIPPED | OUTPATIENT
Start: 2025-07-01

## 2025-07-01 RX ORDER — FLUTICASONE FUROATE AND VILANTEROL 200; 25 UG/1; UG/1
1 POWDER RESPIRATORY (INHALATION)
Qty: 1 EACH | Refills: 5 | Status: SHIPPED | OUTPATIENT
Start: 2025-07-01

## 2025-07-01 NOTE — PROGRESS NOTES
"[facility] Pulmonary Follow up      Chief Complaint  Severe persistent asthma with acute exacerbation (Follow Up)    Subjective          Dorene Huizar presents to [facility] for routine follow-up on her asthma.  She has a history of severe persistent asthma with multiple ED visits, most recently last month.    Currently she is on Breo 200 mcg daily as well as albuterol nebulizers twice daily.  She takes her antihistamine and montelukast.  She has had some issues with medication adherence in the past but, but is doing well recently taking her medicines as prescribed.    We did follow-up on some labs her IgE and eosinophil count are both normal, she was only mildly allergic to dust.        Objective     Vital Signs:   /62   Pulse 76   Temp 98 °F (36.7 °C) (Temporal)   Ht 162.6 cm (64.02\")   Wt 98.9 kg (218 lb)   SpO2 99% Comment: Room air at rest  BMI 37.40 kg/m²       Immunization History   Administered Date(s) Administered    COVID-19 (MODERNA) 12YRS+ (SPIKEVAX) 09/17/2024    COVID-19 (MODERNA) 1st,2nd,3rd Dose Monovalent 03/31/2021, 04/28/2021, 12/29/2021    COVID-19 (UNSPECIFIED) 03/31/2021, 04/28/2021, 12/29/2021    Fluad Quad 65+ 11/01/2021    Fluzone (or Fluarix & Flulaval for VFC) >6mos 10/26/2018    Fluzone High-Dose 65+YRS 10/20/2018, 11/21/2019, 09/02/2020, 09/17/2024    Fluzone High-Dose 65+yrs 09/02/2020, 09/12/2022, 09/11/2023    Hep A / Hep B 12/20/2018    Hep A, Unspecified 11/14/2018    Hepatitis A 11/14/2018    Influenza, Unspecified 10/11/2021    Pneumococcal Conjugate 13-Valent (PCV13) 11/20/2018, 11/21/2019    Pneumococcal Polysaccharide (PPSV23) 08/01/2018, 08/13/2018       Physical Exam  Vitals reviewed.   Constitutional:       General: She is not in acute distress.     Appearance: She is well-developed.   HENT:      Head: Normocephalic and atraumatic.   Eyes:      Pupils: Pupils are equal, round, and reactive to light. "   Cardiovascular:      Rate and Rhythm: Normal rate and regular rhythm.      Heart sounds: No murmur heard.  Pulmonary:      Effort: Pulmonary effort is normal. No respiratory distress.      Breath sounds: Wheezing (exp wheezing) present. No rales.   Abdominal:      General: Bowel sounds are normal. There is no distension.      Palpations: Abdomen is soft.   Musculoskeletal:         General: Normal range of motion.      Cervical back: Normal range of motion and neck supple.   Skin:     General: Skin is warm and dry.      Findings: No erythema.   Neurological:      Mental Status: She is alert and oriented to person, place, and time.   Psychiatric:         Behavior: Behavior normal.          Result Review :       Data reviewed : Radiologic studies xray from [facility] ED visit      PFTs done in the office today:  No significant change in her restrictive lung disease since last year with FVC of 1.36, 59% predicted.  Normal adjusted DLCO.                     Assessment and Plan    Problem List Items Addressed This Visit          Allergies and Adverse Reactions    Environmental and seasonal allergies - Primary       Pulmonary and Pneumonias    Severe persistent asthma without complication    Relevant Medications    Fluticasone Furoate-Vilanterol (BREO ELLIPTA) 200-25 MCG/ACT inhaler    albuterol sulfate  (90 Base) MCG/ACT inhaler    Other Relevant Orders    Breathing Capacity Test (Completed)     Other Visit Diagnoses         Severe persistent asthma with acute exacerbation        Relevant Medications    Fluticasone Furoate-Vilanterol (BREO ELLIPTA) 200-25 MCG/ACT inhaler    albuterol sulfate  (90 Base) MCG/ACT inhaler          I follow Ms. Huizar here in the office for severe persistent asthma.  Her PFTs are stable.    She has done better on her current inhaler regimen with the Breo 2 mcg dose daily, albuterol nebulizers twice daily, and consistently taking her montelukast and Zyrtec.    I followed up on her  IgE and eosinophils which were both normal.  We have discussed in the past possible biologic agents if she continues to require prednisone frequently.    Routine follow-up in 6 months or sooner as needed.      Follow Up     Return in about 6 months (around 1/1/2026).  Patient was given instructions and counseling regarding her condition or for health maintenance advice. Please see specific information pulled into the AVS if appropriate.         Excluding time spent on other separate services such as performing procedures or test interpretation, if applicable    Moderate level of Medical Decision Making complexity based on 2 or more chronic stable illnesses and prescription drug management.    ESSIE Eastman, ACNP  [facility] Pulmonary Critical Care Medicine  Electronically signed